# Patient Record
Sex: MALE | Race: WHITE | NOT HISPANIC OR LATINO | Employment: OTHER | URBAN - METROPOLITAN AREA
[De-identification: names, ages, dates, MRNs, and addresses within clinical notes are randomized per-mention and may not be internally consistent; named-entity substitution may affect disease eponyms.]

---

## 2018-01-05 ENCOUNTER — APPOINTMENT (OUTPATIENT)
Dept: RADIOLOGY | Facility: CLINIC | Age: 68
End: 2018-01-05
Payer: COMMERCIAL

## 2018-01-05 ENCOUNTER — GENERIC CONVERSION - ENCOUNTER (OUTPATIENT)
Dept: OTHER | Facility: OTHER | Age: 68
End: 2018-01-05

## 2018-01-05 ENCOUNTER — TRANSCRIBE ORDERS (OUTPATIENT)
Dept: ADMINISTRATIVE | Facility: HOSPITAL | Age: 68
End: 2018-01-05

## 2018-01-05 DIAGNOSIS — M25.569 PAIN IN KNEE: ICD-10-CM

## 2018-01-05 DIAGNOSIS — M23.42 LOOSE BODY OF LEFT KNEE: ICD-10-CM

## 2018-01-05 DIAGNOSIS — M23.42 LOOSE BODY OF LEFT KNEE: Primary | ICD-10-CM

## 2018-01-05 PROCEDURE — 73562 X-RAY EXAM OF KNEE 3: CPT

## 2018-01-12 ENCOUNTER — HOSPITAL ENCOUNTER (OUTPATIENT)
Dept: MRI IMAGING | Facility: HOSPITAL | Age: 68
Discharge: HOME | End: 2018-01-12
Attending: FAMILY MEDICINE
Payer: COMMERCIAL

## 2018-01-12 ENCOUNTER — GENERIC CONVERSION - ENCOUNTER (OUTPATIENT)
Dept: OTHER | Facility: OTHER | Age: 68
End: 2018-01-12

## 2018-01-12 DIAGNOSIS — M23.42 LOOSE BODY OF LEFT KNEE: ICD-10-CM

## 2018-01-12 PROCEDURE — 73721 MRI JNT OF LWR EXTRE W/O DYE: CPT

## 2018-01-15 ENCOUNTER — GENERIC CONVERSION - ENCOUNTER (OUTPATIENT)
Dept: OTHER | Facility: OTHER | Age: 68
End: 2018-01-15

## 2018-01-24 VITALS — SYSTOLIC BLOOD PRESSURE: 176 MMHG | WEIGHT: 234 LBS | DIASTOLIC BLOOD PRESSURE: 97 MMHG | HEART RATE: 86 BPM

## 2018-01-24 VITALS — HEART RATE: 81 BPM | DIASTOLIC BLOOD PRESSURE: 80 MMHG | SYSTOLIC BLOOD PRESSURE: 150 MMHG

## 2018-02-05 ENCOUNTER — OFFICE VISIT (OUTPATIENT)
Dept: OBGYN CLINIC | Facility: MEDICAL CENTER | Age: 68
End: 2018-02-05
Payer: COMMERCIAL

## 2018-02-05 VITALS
DIASTOLIC BLOOD PRESSURE: 76 MMHG | HEART RATE: 82 BPM | SYSTOLIC BLOOD PRESSURE: 146 MMHG | HEIGHT: 71 IN | WEIGHT: 233 LBS | BODY MASS INDEX: 32.62 KG/M2

## 2018-02-05 DIAGNOSIS — M17.5 OTHER SECONDARY OSTEOARTHRITIS OF LEFT KNEE: Primary | ICD-10-CM

## 2018-02-05 PROBLEM — M19.90 DJD (DEGENERATIVE JOINT DISEASE): Status: ACTIVE | Noted: 2018-01-05

## 2018-02-05 PROCEDURE — 20610 DRAIN/INJ JOINT/BURSA W/O US: CPT | Performed by: FAMILY MEDICINE

## 2018-02-05 RX ORDER — AMLODIPINE BESYLATE 5 MG/1
5 TABLET ORAL 2 TIMES DAILY
Refills: 6 | COMMUNITY
Start: 2018-01-02 | End: 2018-12-14 | Stop reason: ALTCHOICE

## 2018-02-05 RX ORDER — LIDOCAINE HYDROCHLORIDE 10 MG/ML
3 INJECTION, SOLUTION INFILTRATION; PERINEURAL
Status: COMPLETED | OUTPATIENT
Start: 2018-02-05 | End: 2018-02-05

## 2018-02-05 RX ORDER — INSULIN GLARGINE 100 [IU]/ML
INJECTION, SOLUTION SUBCUTANEOUS
Refills: 6 | COMMUNITY
Start: 2018-01-02 | End: 2019-02-18 | Stop reason: SDUPTHER

## 2018-02-05 RX ADMIN — LIDOCAINE HYDROCHLORIDE 3 ML: 10 INJECTION, SOLUTION INFILTRATION; PERINEURAL at 11:31

## 2018-02-05 NOTE — PROGRESS NOTES
1  Other secondary osteoarthritis of left knee       Orders Placed This Encounter   Procedures    Large joint arthrocentesis        Imaging Studies (I personally reviewed results in PACS):    IMPRESSION:    Severe left knee osteoarthritis status post septic joint arthritis 8 years ago      Return in about 6 weeks (around 3/19/2018)  Will discuss consideration knee replacement   Versus corticosteroid injection at that time  Patient Instructions   Synvisc one  injection given today  Informed patient of risk of infection  Educated patient if any redness, swelling, fevers, worsening pain and he is to notify physician immediately as may be a sign of infection in his knee and need for immediate intervention  CHIEF COMPLAINT:  Follow-up left knee arthritis    HPI:  Hung Mason is a 74 y o  male  who presents for  Follow-up left knee arthritis  Patient's septic knee arthritis approximately 8 years ago with treatment and now has severe tricompartmental arthritis  He is reluctant for knee replacement this time and wishes to pursue conservative nonoperative treatment  He presents today for injection of Synvisc one  He denies any history of blood thinner use  He does have diabetes but states is well controlled  Review of Systems   Constitutional: Negative for chills and fever     Musculoskeletal:        Positive left knee stiffness         Following history reviewed and update:    Past Medical History:   Diagnosis Date    Diabetes ([address] )     type 2    Hypertension      Past Surgical History:   Procedure Laterality Date    HERNIA REPAIR  2013    KNEE SURGERY       Social History   History   Alcohol Use    Yes     Comment: occasional     History   Drug Use No     History   Smoking Status    Never Smoker   Smokeless Tobacco    Never Used     Family History   Problem Relation Age of Onset    Aortic stenosis Mother     No Known Problems Father     Diabetes Paternal Grandfather      No Known Allergies       Physical Exam   Constitutional: He is oriented to person, place, and time  He appears well-developed and well-nourished  HENT:   Head: Normocephalic and atraumatic  Right Ear: External ear normal    Left Ear: External ear normal    Nose: Nose normal    Eyes: Conjunctivae are normal  Right eye exhibits no discharge  Left eye exhibits no discharge  No scleral icterus  Neck: Neck supple  Pulmonary/Chest: Effort normal  No respiratory distress  Musculoskeletal:        Left knee: He exhibits no effusion  Neurological: He is alert and oriented to person, place, and time  Psychiatric: He has a normal mood and affect  His behavior is normal  Judgment and thought content normal        Left Knee Exam     Tenderness   The patient is experiencing no tenderness           Other   Erythema: absent  Swelling: none  Effusion: no effusion present              Large joint arthrocentesis  Date/Time: 2/5/2018 11:31 AM  Consent given by: patient  Site marked: site marked  Timeout: Immediately prior to procedure a time out was called to verify the correct patient, procedure, equipment, support staff and site/side marked as required   Supporting Documentation  Indications: pain   Procedure Details  Location: knee -   Preparation: Patient was prepped and draped in the usual sterile fashion (Betadine and alcohol)  Needle size: 22 G  Ultrasound guidance: no  Approach: anterolateral  Medications administered: 3 mL lidocaine 1 %; 48 mg hylan 48 MG/6ML    Patient tolerance: patient tolerated the procedure well with no immediate complications  Dressing:  Sterile dressing applied

## 2018-02-05 NOTE — PATIENT INSTRUCTIONS
Synvisc one  injection given today  Informed patient of risk of infection  Educated patient if any redness, swelling, fevers, worsening pain and he is to notify physician immediately as may be a sign of infection in his knee and need for immediate intervention

## 2018-03-19 ENCOUNTER — OFFICE VISIT (OUTPATIENT)
Dept: OBGYN CLINIC | Facility: MEDICAL CENTER | Age: 68
End: 2018-03-19
Payer: COMMERCIAL

## 2018-03-19 VITALS
DIASTOLIC BLOOD PRESSURE: 90 MMHG | HEART RATE: 80 BPM | BODY MASS INDEX: 32.06 KG/M2 | HEIGHT: 71 IN | WEIGHT: 229 LBS | SYSTOLIC BLOOD PRESSURE: 155 MMHG

## 2018-03-19 DIAGNOSIS — M17.5 OTHER SECONDARY OSTEOARTHRITIS OF LEFT KNEE: Primary | ICD-10-CM

## 2018-03-19 PROCEDURE — 20610 DRAIN/INJ JOINT/BURSA W/O US: CPT | Performed by: FAMILY MEDICINE

## 2018-03-19 RX ORDER — INSULIN GLULISINE 100 [IU]/ML
INJECTION, SOLUTION SUBCUTANEOUS
COMMUNITY
Start: 2018-02-27 | End: 2018-06-22

## 2018-03-19 RX ORDER — LIDOCAINE HYDROCHLORIDE 10 MG/ML
4 INJECTION, SOLUTION INFILTRATION; PERINEURAL
Status: COMPLETED | OUTPATIENT
Start: 2018-03-19 | End: 2018-03-19

## 2018-03-19 RX ORDER — TRIAMCINOLONE ACETONIDE 40 MG/ML
40 INJECTION, SUSPENSION INTRA-ARTICULAR; INTRAMUSCULAR
Status: COMPLETED | OUTPATIENT
Start: 2018-03-19 | End: 2018-03-19

## 2018-03-19 RX ADMIN — TRIAMCINOLONE ACETONIDE 40 MG: 40 INJECTION, SUSPENSION INTRA-ARTICULAR; INTRAMUSCULAR at 11:05

## 2018-03-19 RX ADMIN — LIDOCAINE HYDROCHLORIDE 4 ML: 10 INJECTION, SOLUTION INFILTRATION; PERINEURAL at 11:05

## 2018-03-19 NOTE — PATIENT INSTRUCTIONS
Continue home exercises  reviewed red flags with patient regarding injection including infection, skin dimpling, hypo-pigmentation, and nerve damage  patient expressed understanding and agreed to proceed with procedure  educated if any symptoms including fevers, chills, swelling, or worsening symptoms occur then to call office or go to hospital for immediate care if physician unavailable  patient expressed understanding and agreed to plan  Will follow up in 3 months to consider repeat corticosteroid injection  Patient will also likely require 2nd Synvisc injection 6 months from his last 02/05/2018

## 2018-03-19 NOTE — PROGRESS NOTES
1  Other secondary osteoarthritis of left knee  Large joint arthrocentesis     Orders Placed This Encounter   Procedures    Large joint arthrocentesis        Imaging Studies (I personally reviewed results in PACS):    Past diagnostics:  X-ray left knee 01/05/2018:  Severe tricompartmental arthritis  MRI left knee 01/12/2018:  Severe tricompartmental arthritis with bilateral meniscal tears    IMPRESSION:  Severe left knee osteoarthritis status post septic joint arthritis 2010      Return in about 3 months (around 6/19/2018)  Will discuss consideration knee replacement   Versus corticosteroid injection at that time  Patient Instructions   Continue home exercises  reviewed red flags with patient regarding injection including infection, skin dimpling, hypo-pigmentation, and nerve damage  patient expressed understanding and agreed to proceed with procedure  educated if any symptoms including fevers, chills, swelling, or worsening symptoms occur then to call office or go to hospital for immediate care if physician unavailable  patient expressed understanding and agreed to plan  Will follow up in 3 months to consider repeat corticosteroid injection  Patient will also likely require 2nd Synvisc injection 6 months from his last 02/05/2018  CHIEF COMPLAINT:  Follow-up left knee arthritis    HPI:  Roberto Farias is a 74 y o  male  who presents for  Follow-up left knee arthritis  Patient's septic knee arthritis approximately 8 years ago with treatment and now has severe tricompartmental arthritis  He is reluctant for knee replacement this time and wishes to pursue conservative nonoperative treatment  Last visit patient was given Synvisc-One injection on 02/05/2018  He states he did have improvement in his left knee; however, he still has persistent mild to moderate achiness and stiffness worst in the morning time  Chronic left knee arthritis uncontrolled          Review of Systems   Constitutional: Negative for chills and fever  Musculoskeletal:        Positive left knee stiffness         Following history reviewed and update:    Past Medical History:   Diagnosis Date    Diabetes ([address] )     type 2    Hypertension      Past Surgical History:   Procedure Laterality Date    HERNIA REPAIR  2013    KNEE SURGERY       Social History   History   Alcohol Use    Yes     Comment: occasional     History   Drug Use No     History   Smoking Status    Never Smoker   Smokeless Tobacco    Never Used     Family History   Problem Relation Age of Onset    Aortic stenosis Mother     No Known Problems Father     Diabetes Paternal Grandfather      No Known Allergies       Physical Exam   Constitutional: He is oriented to person, place, and time  He appears well-developed and well-nourished  HENT:   Head: Normocephalic and atraumatic  Right Ear: External ear normal    Left Ear: External ear normal    Nose: Nose normal    Eyes: Conjunctivae are normal  Right eye exhibits no discharge  Left eye exhibits no discharge  No scleral icterus  Neck: Neck supple  Pulmonary/Chest: Effort normal  No respiratory distress  Musculoskeletal:        Left knee: He exhibits no effusion  Neurological: He is alert and oriented to person, place, and time  Psychiatric: He has a normal mood and affect  His behavior is normal  Judgment and thought content normal        Left Knee Exam     Tenderness   The patient is experiencing no tenderness           Other   Erythema: absent  Swelling: none  Effusion: no effusion present              Large joint arthrocentesis  Date/Time: 3/19/2018 11:05 AM  Consent given by: patient  Site marked: site marked  Timeout: Immediately prior to procedure a time out was called to verify the correct patient, procedure, equipment, support staff and site/side marked as required   Supporting Documentation  Indications: pain   Procedure Details  Location: knee - L knee  Preparation: Patient was prepped and draped in the usual sterile fashion (betadine if not allergic   alcohol before and after ethyl chloride spray)  Needle size: 22 G  Ultrasound guidance: no  Approach: anterolateral  Medications administered: 4 mL lidocaine 1 %; 40 mg triamcinolone acetonide 40 mg/mL    Patient tolerance: patient tolerated the procedure well with no immediate complications  Dressing:  Sterile dressing applied

## 2018-06-22 ENCOUNTER — OFFICE VISIT (OUTPATIENT)
Dept: OBGYN CLINIC | Facility: MEDICAL CENTER | Age: 68
End: 2018-06-22
Payer: COMMERCIAL

## 2018-06-22 ENCOUNTER — APPOINTMENT (OUTPATIENT)
Dept: RADIOLOGY | Facility: CLINIC | Age: 68
End: 2018-06-22
Payer: COMMERCIAL

## 2018-06-22 VITALS
HEIGHT: 71 IN | HEART RATE: 80 BPM | DIASTOLIC BLOOD PRESSURE: 89 MMHG | WEIGHT: 227 LBS | SYSTOLIC BLOOD PRESSURE: 165 MMHG | BODY MASS INDEX: 31.78 KG/M2

## 2018-06-22 DIAGNOSIS — M17.5 OTHER SECONDARY OSTEOARTHRITIS OF LEFT KNEE: ICD-10-CM

## 2018-06-22 DIAGNOSIS — M54.5 LOW BACK PAIN, UNSPECIFIED BACK PAIN LATERALITY, UNSPECIFIED CHRONICITY, WITH SCIATICA PRESENCE UNSPECIFIED: Primary | ICD-10-CM

## 2018-06-22 DIAGNOSIS — M54.5 LOW BACK PAIN, UNSPECIFIED BACK PAIN LATERALITY, UNSPECIFIED CHRONICITY, WITH SCIATICA PRESENCE UNSPECIFIED: ICD-10-CM

## 2018-06-22 PROCEDURE — 20610 DRAIN/INJ JOINT/BURSA W/O US: CPT | Performed by: FAMILY MEDICINE

## 2018-06-22 PROCEDURE — 99214 OFFICE O/P EST MOD 30 MIN: CPT | Performed by: FAMILY MEDICINE

## 2018-06-22 PROCEDURE — 72100 X-RAY EXAM L-S SPINE 2/3 VWS: CPT

## 2018-06-22 RX ORDER — BUPIVACAINE HYDROCHLORIDE 2.5 MG/ML
2 INJECTION, SOLUTION INFILTRATION; PERINEURAL
Status: COMPLETED | OUTPATIENT
Start: 2018-06-22 | End: 2018-06-22

## 2018-06-22 RX ORDER — CEPHALEXIN 500 MG/1
CAPSULE ORAL
Refills: 0 | COMMUNITY
Start: 2018-05-23 | End: 2018-06-22

## 2018-06-22 RX ORDER — ASPIRIN 81 MG/1
TABLET, CHEWABLE ORAL AS NEEDED
COMMUNITY
Start: 2018-01-02 | End: 2019-06-21

## 2018-06-22 RX ORDER — METHYLPREDNISOLONE ACETATE 40 MG/ML
1 INJECTION, SUSPENSION INTRA-ARTICULAR; INTRALESIONAL; INTRAMUSCULAR; SOFT TISSUE
Status: COMPLETED | OUTPATIENT
Start: 2018-06-22 | End: 2018-06-22

## 2018-06-22 RX ORDER — ROSUVASTATIN CALCIUM 5 MG/1
TABLET, COATED ORAL EVERY 24 HOURS
COMMUNITY
Start: 2018-02-12 | End: 2018-06-22

## 2018-06-22 RX ADMIN — METHYLPREDNISOLONE ACETATE 1 ML: 40 INJECTION, SUSPENSION INTRA-ARTICULAR; INTRALESIONAL; INTRAMUSCULAR; SOFT TISSUE at 13:04

## 2018-06-22 RX ADMIN — BUPIVACAINE HYDROCHLORIDE 2 ML: 2.5 INJECTION, SOLUTION INFILTRATION; PERINEURAL at 13:04

## 2018-06-22 NOTE — PATIENT INSTRUCTIONS
Explained the patient that he did have viscosupplementation injection in February 5, 2018  Usually insurance companies pay for this  Once every 6 months  His next anticipated possible date of viscosupplementation as August or September of 2018  Patient will call back at that time if he has any pain or desire to have viscosupplementation and we will set up his appointment  reviewed red flags with patient regarding injection including infection, skin dimpling, hypo-pigmentation, and nerve damage  patient expressed understanding and agreed to proceed with procedure  educated if any symptoms including fevers, chills, swelling, or worsening symptoms occur then to call office or go to hospital for immediate care if physician unavailable  patient expressed understanding and agreed to plan  For patient's back pain explained that he had a back strain and tear of his muscle that is not resolved  He has no evidence of pinched nerve at this time  He has mild-to-moderate osteoarthritis of his low back   Known as degenerative disc disease  Educated risks of mixing NSAIDS (also known as non-steroidal anti-inflammatory pills) including advil, ibuprofen, motrin, aleve, naproxen, aspirin, and ibuprofen containing products with each other or with steroids (such as prednisone, medrol)  Explained risks of mixing these medications including stomach ulcer, severe internal bleeding, and kidney failure  Instructed not to take NSAIDS if have history of stomach ulcers, kidney issues, or hypertension  Instructed patient to use only one brand as prescribed  For naproxen, a maximum of 500 mg per dose every 12 hours and no more than two doses or 1,000mg per day  For Ibuprofen, a maximum of 800 mg per dose every 6 hours but no more than 3 doses or 2,400 mg per day  Never take these medications together  Never take these medications the same day   For severe pain and only if you have no liver problems, you may add Tylenol (also known as acetaminophen) maximum of 1,000  Mg per dose every 6 hours but no more 3 doses or 3,000 mg per day  Patient expressed understanding and agreed to plan

## 2018-06-22 NOTE — PROGRESS NOTES
1  Low back pain, unspecified back pain laterality, unspecified chronicity, with sciatica presence unspecified  XR spine lumbar 2 or 3 views injury   2  Other secondary osteoarthritis of left knee       Orders Placed This Encounter   Procedures    Large joint arthrocentesis    XR spine lumbar 2 or 3 views injury        Imaging Studies (I personally reviewed results in PACS):  X-ray lumbar vertebra 06/22/2018:  Mild-moderate degenerative disc disease diffuse    Past diagnostics:  X-ray left knee 01/05/2018:  Severe tricompartmental arthritis  MRI left knee 01/12/2018:  Severe tricompartmental arthritis with bilateral meniscal tears    IMPRESSION:  Severe left knee osteoarthritis status post septic joint arthritis 2010      Return if symptoms worsen or fail to improve  Patient Instructions   Explained the patient that he did have viscosupplementation injection in February 5, 2018  Usually insurance companies pay for this  Once every 6 months  His next anticipated possible date of viscosupplementation as August or September of 2018  Patient will call back at that time if he has any pain or desire to have viscosupplementation and we will set up his appointment  reviewed red flags with patient regarding injection including infection, skin dimpling, hypo-pigmentation, and nerve damage  patient expressed understanding and agreed to proceed with procedure  educated if any symptoms including fevers, chills, swelling, or worsening symptoms occur then to call office or go to hospital for immediate care if physician unavailable  patient expressed understanding and agreed to plan  For patient's back pain explained that he had a back strain and tear of his muscle that is not resolved  He has no evidence of pinched nerve at this time  He has mild-to-moderate osteoarthritis of his low back   Known as degenerative disc disease      Educated risks of mixing NSAIDS (also known as non-steroidal anti-inflammatory pills) including advil, ibuprofen, motrin, aleve, naproxen, aspirin, and ibuprofen containing products with each other or with steroids (such as prednisone, medrol)  Explained risks of mixing these medications including stomach ulcer, severe internal bleeding, and kidney failure  Instructed not to take NSAIDS if have history of stomach ulcers, kidney issues, or hypertension  Instructed patient to use only one brand as prescribed  For naproxen, a maximum of 500 mg per dose every 12 hours and no more than two doses or 1,000mg per day  For Ibuprofen, a maximum of 800 mg per dose every 6 hours but no more than 3 doses or 2,400 mg per day  Never take these medications together  Never take these medications the same day  For severe pain and only if you have no liver problems, you may add Tylenol (also known as acetaminophen) maximum of 1,000  Mg per dose every 6 hours but no more 3 doses or 3,000 mg per day  Patient expressed understanding and agreed to plan  CHIEF COMPLAINT:  Follow-up left knee arthritis and complains of back pain    HPI:  Lacey Dunn is a 74 y o  male  who presents for  Follow-up left knee arthritis  Patient's septic knee arthritis approximately 8 years ago with treatment and now has severe tricompartmental arthritis  He is reluctant for knee replacement this time and wishes to pursue conservative nonoperative treatment  Last visit patient was given Synvisc-One injection on 02/05/2018  He states he did have improvement in his left knee; however, he still has persistent mild to moderate achiness and stiffness worst in the morning time  Chronic left knee arthritis uncontrolled  Visit 06/22/2018:  Patient here for follow-up left knee secondary osteoarthritis status post septic joint  Today states he feels significantly improved  He states that it is last visit in March 2018 he fell he had great relief after Synvisc as well as corticosteroid injection   He does complain of mild to moderate discomfort in his left knee but this is no where near as worse at was in the past   He does request repeat corticosteroid injection today  Patient also complains of back pain today  He states that approximately 1 month ago he strained his back  He is significantly improved  He still early has some residual stiffness and left lower aspect of his back  He denies any pain radiating down his leg  Denies any numbness and tingling  He denies any weakness  His discomfort is intermittent exacerbated by certain positions only mild intensity  Review of Systems   Constitutional: Negative for chills, fever and unexpected weight change  HENT: Negative for hearing loss, nosebleeds and sore throat  Eyes: Negative for pain, redness and visual disturbance  Respiratory: Negative for cough, shortness of breath and wheezing  Cardiovascular: Negative for chest pain, palpitations and leg swelling  Gastrointestinal: Negative for abdominal distention, nausea and vomiting  Endocrine: Negative for polydipsia and polyuria  Genitourinary: Negative for dysuria and hematuria  Skin: Negative for rash and wound  Neurological: Negative for dizziness, numbness and headaches  Psychiatric/Behavioral: Negative for decreased concentration and suicidal ideas           Following history reviewed and update:    Past Medical History:   Diagnosis Date    Diabetes ([facility] [address] )     type 2    Hypertension      Past Surgical History:   Procedure Laterality Date    HERNIA REPAIR  2013    KNEE SURGERY      NECK SURGERY      basil cell carcinoma      Social History   History   Alcohol Use    Yes     Comment: occasional     History   Drug Use No     History   Smoking Status    Never Smoker   Smokeless Tobacco    Never Used     Family History   Problem Relation Age of Onset    Aortic stenosis Mother     No Known Problems Father     Diabetes Paternal Grandfather      No Known Allergies       Physical Exam   Constitutional: He is oriented to person, place, and time  He appears well-developed and well-nourished  HENT:   Head: Normocephalic and atraumatic  Right Ear: External ear normal    Left Ear: External ear normal    Nose: Nose normal    Eyes: Conjunctivae are normal  Right eye exhibits no discharge  Left eye exhibits no discharge  No scleral icterus  Neck: Neck supple  Pulmonary/Chest: Effort normal  No respiratory distress  Neurological: He is alert and oriented to person, place, and time  Psychiatric: He has a normal mood and affect  His behavior is normal  Judgment and thought content normal            LEFT KNEE:  Erythema: no  Swelling: no  Increased Warmth: no  Tenderness: none  Flexion: intact  Extension: intact  Patellar Displacement:  Patellar Tilt:  Patellar Apprehension: negative  Patellar Grind Parekh's: negative  Lachman's: negative  Drawer: negative  Varus laxity: negative  Valgus laxity: negative  [facility]: negative   Thessaly Test:  Dial Test:     Large joint arthrocentesis  Date/Time: 6/22/2018 1:04 PM  Consent given by: patient  Site marked: site marked  Timeout: Immediately prior to procedure a time out was called to verify the correct patient, procedure, equipment, support staff and site/side marked as required   Supporting Documentation  Indications: pain   Procedure Details  Location: knee - L knee  Preparation: Patient was prepped and draped in the usual sterile fashion (betadine if not allergic   alcohol before and after ethyl chloride spray)  Needle size: 22 G  Ultrasound guidance: no  Approach: anterolateral  Medications administered: 2 mL bupivacaine 0 25 %; 1 mL methylPREDNISolone acetate 40 mg/mL    Patient tolerance: patient tolerated the procedure well with no immediate complications  Dressing:  Sterile dressing applied

## 2018-08-20 ENCOUNTER — TELEPHONE (OUTPATIENT)
Dept: PAIN MEDICINE | Facility: MEDICAL CENTER | Age: 68
End: 2018-08-20

## 2018-08-20 NOTE — TELEPHONE ENCOUNTER
Patient called to schedule an appt for a synvisc injection  Patient stated someone was supposed to be working on this as of 08/05/18 but no one has yet to call   C/b [phone]

## 2018-08-21 NOTE — TELEPHONE ENCOUNTER
Patient called back again today for an update  Transferred to [facility]

## 2018-08-27 NOTE — TELEPHONE ENCOUNTER
Patient has an appt scheduled as of today, and that appt is for this Thursday, 8/30   He is B&B per Shannon

## 2018-08-27 NOTE — TELEPHONE ENCOUNTER
Patient called again to follow up on status of synvisc injections  He is frustrated he is not getting any response

## 2018-09-07 ENCOUNTER — OFFICE VISIT (OUTPATIENT)
Dept: OBGYN CLINIC | Facility: MEDICAL CENTER | Age: 68
End: 2018-09-07
Payer: COMMERCIAL

## 2018-09-07 VITALS
HEART RATE: 81 BPM | BODY MASS INDEX: 31.78 KG/M2 | DIASTOLIC BLOOD PRESSURE: 80 MMHG | WEIGHT: 227 LBS | HEIGHT: 71 IN | SYSTOLIC BLOOD PRESSURE: 149 MMHG

## 2018-09-07 DIAGNOSIS — M17.5 OTHER SECONDARY OSTEOARTHRITIS OF LEFT KNEE: Primary | ICD-10-CM

## 2018-09-07 PROCEDURE — 20610 DRAIN/INJ JOINT/BURSA W/O US: CPT | Performed by: FAMILY MEDICINE

## 2018-09-07 PROCEDURE — 99213 OFFICE O/P EST LOW 20 MIN: CPT | Performed by: FAMILY MEDICINE

## 2018-09-07 RX ADMIN — METHYLPREDNISOLONE ACETATE 1 ML: 40 INJECTION, SUSPENSION INTRA-ARTICULAR; INTRALESIONAL; INTRAMUSCULAR; SOFT TISSUE at 16:42

## 2018-09-07 RX ADMIN — LIDOCAINE HYDROCHLORIDE 4 ML: 10 INJECTION, SOLUTION INFILTRATION; PERINEURAL at 16:42

## 2018-09-07 NOTE — PATIENT INSTRUCTIONS
Treatment for knee osteoarthritis depends on severity of cartilage wear and pain levels  First line for mild arthritis is exercise to strengthen the knee and allow better joint movement, 5-10% weight loss if overweight, and topical anti-inflammatories such as diclofenac gel or topical analgesic pain relief creams such as capsaicin  Symptoms at this stage usually improve in 3 months  Moderate to severe arthritis or arthritis not responding to first line treatments may require oral medicine such as anti-inflammatories (NSAIDs) such as ibuprofen/motrin, and if failing treatment with oral NSAIDs, then may consider depression medicines such as duloxetine (cymbalta) which also have been shown to work on pain receptors and help to control pain  Other analgesics such as tylenol (acetaminophen) may be used but do not appear to offer significant pain relief  Supplements such as glucosamine and chondroitin supplements  Some studies do show benefit from taking glucosamine sulfate (1500 mg/day) and chondroitin (800 mg/day) but evidence is controversial  I recommend against a type of glucosamine known as "glucosamine hydrochloride" which appears not as effective as glucosamine sulfate  If no improvement with oral medicines, then patients may consider steroid injection into the knee joint which provides pain relief  Steroid injections can be given every 3 months  Any sooner than that can  result in possible deterioration or cartilage in your joint  Other injections are available such as as viscosupplementation or gel shots into the knee which provide nutrients for the cartilage and can result in pain reduction  These viscosupplementation injections are generally considered every 6 months but are controversial   The [address] recommends against viscosupplementation injection; however, the [facility] recommends for these injections       Beyond these injections there are other controversial treatments including stem cell as well as platelet rich plasma injection which are out of pocket usually up to a 1000 dollars per injection  Lastly, if you fail conservative measures and have persistent pain, then we may consider referral to surgeon for knee replacement  (ROSIE Chao 2018)

## 2018-09-07 NOTE — PROGRESS NOTES
1  Other secondary osteoarthritis of left knee       Orders Placed This Encounter   Procedures    Large joint arthrocentesis        Imaging Studies (I personally reviewed results in PACS):      Past diagnostics:  X-ray lumbar vertebra 06/22/2018:  Mild-moderate degenerative disc disease diffuse  X-ray left knee 01/05/2018:  Severe tricompartmental arthritis  MRI left knee 01/12/2018:  Severe tricompartmental arthritis with bilateral meniscal tears    IMPRESSION:  Severe left knee osteoarthritis status post septic joint arthritis 2010    Interventions:  Viscosupplementation Synvisc-One injection left knee-09/07/2018   Corticosteroid injection-left knee-06/22/2018    Return if symptoms worsen or fail to improve  Patient Instructions   Treatment for knee osteoarthritis depends on severity of cartilage wear and pain levels  First line for mild arthritis is exercise to strengthen the knee and allow better joint movement, 5-10% weight loss if overweight, and topical anti-inflammatories such as diclofenac gel or topical analgesic pain relief creams such as capsaicin  Symptoms at this stage usually improve in 3 months  Moderate to severe arthritis or arthritis not responding to first line treatments may require oral medicine such as anti-inflammatories (NSAIDs) such as ibuprofen/motrin, and if failing treatment with oral NSAIDs, then may consider depression medicines such as duloxetine (cymbalta) which also have been shown to work on pain receptors and help to control pain  Other analgesics such as tylenol (acetaminophen) may be used but do not appear to offer significant pain relief  Supplements such as glucosamine and chondroitin supplements   Some studies do show benefit from taking glucosamine sulfate (1500 mg/day) and chondroitin (800 mg/day) but evidence is controversial  I recommend against a type of glucosamine known as "glucosamine hydrochloride" which appears not as effective as glucosamine sulfate  If no improvement with oral medicines, then patients may consider steroid injection into the knee joint which provides pain relief  Steroid injections can be given every 3 months  Any sooner than that can  result in possible deterioration or cartilage in your joint  Other injections are available such as as viscosupplementation or gel shots into the knee which provide nutrients for the cartilage and can result in pain reduction  These viscosupplementation injections are generally considered every 6 months but are controversial   The [address] recommends against viscosupplementation injection; however, the [employer] [facility] recommends for these injections  Beyond these injections there are other controversial treatments including stem cell as well as platelet rich plasma injection which are out of pocket usually up to a 1000 dollars per injection  Lastly, if you fail conservative measures and have persistent pain, then we may consider referral to surgeon for knee replacement  (ROSIE Lopez 2018)  CHIEF COMPLAINT:  Follow-up left knee arthritis     HPI:  Audie Walker is a 74 y o  male  who presents for        Visit 06/22/2018:  Patient here for follow-up left knee secondary osteoarthritis status post septic joint  Today states he feels significantly improved  He states that it is last visit in March 2018 he fell he had great relief after Synvisc as well as corticosteroid injection  He does complain of mild to moderate discomfort in his left knee but this is no where near as worse at was in the past   He does request repeat corticosteroid injection today  Patient also complains of back pain today  He states that approximately 1 month ago he strained his back  He is significantly improved  He still early has some residual stiffness and left lower aspect of his back  He denies any pain radiating down his leg    Denies any numbness and tingling  He denies any weakness  His discomfort is intermittent exacerbated by certain positions only mild intensity  09/07/2018: Follow-up left knee osteoarthritis  Uncontrolled  Interval worsening since last visit  No swelling erythema or increased warmth  Source intermittent medial joint line  Last visit 06/22/2018 given corticosteroid injection with significant improvement until approximately 2-3 weeks ago  Review of Systems   Constitutional: Negative for chills and fever  Neurological: Negative for weakness  Following history reviewed and update:    Past Medical History:   Diagnosis Date    Diabetes ([address] )     type 2    Hypertension      Past Surgical History:   Procedure Laterality Date    HERNIA REPAIR  2013    KNEE SURGERY      NECK SURGERY      basil cell carcinoma      Social History   History   Alcohol Use    Yes     Comment: occasional     History   Drug Use No     History   Smoking Status    Never Smoker   Smokeless Tobacco    Never Used     Family History   Problem Relation Age of Onset    Aortic stenosis Mother     No Known Problems Father     Diabetes Paternal Grandfather      No Known Allergies       Physical Exam   Musculoskeletal:        Left knee: He exhibits no effusion  Left Knee Exam     Tenderness   The patient is experiencing no tenderness           Range of Motion   Extension: -10   Flexion: normal     Tests   Lachman:  Anterior - negative    Posterior - negative  Varus: negative  Valgus: negative    Other   Erythema: absent  Sensation: normal  Swelling: none  Effusion: no effusion present            Large joint arthrocentesis  Date/Time: 9/7/2018 4:42 PM  Consent given by: patient  Site marked: site marked  Timeout: Immediately prior to procedure a time out was called to verify the correct patient, procedure, equipment, support staff and site/side marked as required   Supporting Documentation  Indications: pain and diagnostic evaluation Procedure Details  Location: knee - L knee  Preparation: Patient was prepped and draped in the usual sterile fashion (betadine if not allergic   alcohol before and after ethyl chloride cold spray)  Needle size: 22 G  Ultrasound guidance: no  Approach: superior  Medications administered: 4 mL lidocaine 1 %; 1 mL methylPREDNISolone acetate 40 mg/mL    Patient tolerance: patient tolerated the procedure well with no immediate complications  Dressing:  Sterile dressing applied

## 2018-09-10 RX ORDER — METHYLPREDNISOLONE ACETATE 40 MG/ML
1 INJECTION, SUSPENSION INTRA-ARTICULAR; INTRALESIONAL; INTRAMUSCULAR; SOFT TISSUE
Status: COMPLETED | OUTPATIENT
Start: 2018-09-07 | End: 2018-09-07

## 2018-09-10 RX ORDER — LIDOCAINE HYDROCHLORIDE 10 MG/ML
4 INJECTION, SOLUTION INFILTRATION; PERINEURAL
Status: COMPLETED | OUTPATIENT
Start: 2018-09-07 | End: 2018-09-07

## 2018-09-25 ENCOUNTER — OFFICE VISIT (OUTPATIENT)
Dept: FAMILY MEDICINE CLINIC | Facility: CLINIC | Age: 68
End: 2018-09-25
Payer: COMMERCIAL

## 2018-09-25 VITALS
SYSTOLIC BLOOD PRESSURE: 170 MMHG | HEART RATE: 71 BPM | OXYGEN SATURATION: 98 % | WEIGHT: 229 LBS | BODY MASS INDEX: 32.06 KG/M2 | RESPIRATION RATE: 16 BRPM | HEIGHT: 71 IN | DIASTOLIC BLOOD PRESSURE: 100 MMHG

## 2018-09-25 DIAGNOSIS — I10 ESSENTIAL HYPERTENSION: ICD-10-CM

## 2018-09-25 DIAGNOSIS — E11.9 TYPE 2 DIABETES MELLITUS WITHOUT COMPLICATION, WITHOUT LONG-TERM CURRENT USE OF INSULIN (HCC): Primary | ICD-10-CM

## 2018-09-25 DIAGNOSIS — G62.9 NEUROPATHY: ICD-10-CM

## 2018-09-25 PROCEDURE — 99213 OFFICE O/P EST LOW 20 MIN: CPT | Performed by: SPECIALIST

## 2018-09-25 PROCEDURE — 3008F BODY MASS INDEX DOCD: CPT | Performed by: SPECIALIST

## 2018-09-25 NOTE — PATIENT INSTRUCTIONS
OBTAIN  LAB  TESTS    DO  HOME  MONITOR  BP  AND  SUGARS       NEEDS  EYE  AND  DENTAL  ROUTINE   EXAMS  IF  NOT  CURRENT

## 2018-09-25 NOTE — PROGRESS NOTES
Assessment/Plan:PT NEEDS  TO  TAKE  HIS  BP  MEDS  AND  INCREASE  THE  LANTUS   DM2  NOT  CONTROLLED           Diagnoses and all orders for this visit:    Type 2 diabetes mellitus without complication, without long-term current use of insulin (HCC)  -     CBC and differential; Future  -     Comprehensive metabolic panel; Future  -     Lipid panel; Future  -     Magnesium; Future  -     Microalbumin / creatinine urine ratio  -     UA w Reflex to Microscopic w Reflex to Culture -Lab Collect  -     Vitamin B12; Future  -     Vitamin D 25 hydroxy; Future    Essential hypertension  -     CBC and differential; Future  -     Comprehensive metabolic panel; Future  -     Lipid panel; Future  -     Magnesium; Future  -     Microalbumin / creatinine urine ratio  -     UA w Reflex to Microscopic w Reflex to Culture -Lab Collect  -     Vitamin B12; Future  -     Vitamin D 25 hydroxy; Future    Neuropathy  -     CBC and differential; Future  -     Comprehensive metabolic panel; Future  -     Lipid panel; Future  -     Magnesium; Future  -     Microalbumin / creatinine urine ratio  -     UA w Reflex to Microscopic w Reflex to Culture -Lab Collect  -     Vitamin B12; Future  -     Vitamin D 25 hydroxy; Future          Subjective:      Patient ID: Lilian Villegas is a 74 y o  female  73 YO  MALE  WITH  HT  AND  DM2     NOT  TAKING  HIS   AMLODIPINE   AND   HAS  NOT  AD  LAB  TESTS    NEEDS  LAB      WANTS  TO  TRY  QuarterlySTYLE  ESSIE   SUGAR  MONITOR       DOES  NOT  DO  ACCUCHECKS    ON LANTUS  NOT   METFORMIN            LEGS  HURT ON  METFORMIN        The following portions of the patient's history were reviewed and updated as appropriate: allergies, current medications, past family history, past medical history, past social history, past surgical history and problem list     Review of Systems   Constitutional: Negative for activity change, appetite change, chills, diaphoresis, fatigue and fever     HENT: Negative for sinus pain, sinus pressure and voice change  Eyes: Negative for visual disturbance  Respiratory: Negative for cough, chest tightness, shortness of breath and wheezing  Cardiovascular: Negative for chest pain, palpitations and leg swelling  Gastrointestinal: Negative for abdominal distention and abdominal pain  Genitourinary: Negative for difficulty urinating, dysuria and flank pain  Musculoskeletal: Negative for arthralgias, back pain, gait problem, joint swelling and neck pain  Neurological: Negative for dizziness, tremors, seizures, syncope, facial asymmetry, speech difficulty, weakness, light-headedness, numbness and headaches  Psychiatric/Behavioral: Negative for agitation and behavioral problems  Objective:      /100 (BP Location: Left arm, Patient Position: Sitting, Cuff Size: Adult)   Pulse 71   Resp 16   Ht 5' 11" (1 803 m)   Wt 104 kg (229 lb)   SpO2 98%   BMI 31 94 kg/m²          Physical Exam   Constitutional: She is oriented to person, place, and time  She appears well-developed and well-nourished  No distress  HENT:   Head: Normocephalic  Right Ear: External ear normal    Left Ear: External ear normal    Eyes: Conjunctivae and EOM are normal  Pupils are equal, round, and reactive to light  Right eye exhibits no discharge  Left eye exhibits no discharge  No scleral icterus  Neck: No JVD present  No tracheal deviation present  No thyromegaly present  Cardiovascular: Normal rate, regular rhythm, normal heart sounds and intact distal pulses  Exam reveals no gallop and no friction rub  No murmur heard  Pulmonary/Chest: Effort normal and breath sounds normal  No respiratory distress  She has no wheezes  She has no rales  She exhibits no tenderness  Abdominal: She exhibits no distension  There is no rebound  Musculoskeletal: She exhibits no edema, tenderness or deformity  Neurological: She is alert and oriented to person, place, and time  No cranial nerve deficit  Coordination normal    Skin: Skin is warm and dry  No rash noted  She is not diaphoretic  No erythema  Psychiatric: She has a normal mood and affect   Her behavior is normal

## 2018-10-08 ENCOUNTER — OFFICE VISIT (OUTPATIENT)
Dept: FAMILY MEDICINE CLINIC | Facility: CLINIC | Age: 68
End: 2018-10-08
Payer: COMMERCIAL

## 2018-10-08 DIAGNOSIS — G62.9 NEUROPATHY: ICD-10-CM

## 2018-10-08 DIAGNOSIS — Z79.4 TYPE 2 DIABETES MELLITUS WITHOUT COMPLICATION, WITH LONG-TERM CURRENT USE OF INSULIN (HCC): Primary | ICD-10-CM

## 2018-10-08 DIAGNOSIS — I10 BENIGN HYPERTENSION: ICD-10-CM

## 2018-10-08 DIAGNOSIS — E11.9 TYPE 2 DIABETES MELLITUS WITHOUT COMPLICATION, WITH LONG-TERM CURRENT USE OF INSULIN (HCC): Primary | ICD-10-CM

## 2018-10-08 LAB
25(OH)D3 SERPL-MCNC: 37.7 NG/ML (ref 30–100)
ALBUMIN SERPL BCP-MCNC: 3.7 G/DL (ref 3.5–5)
ALP SERPL-CCNC: 76 U/L (ref 46–116)
ALT SERPL W P-5'-P-CCNC: 28 U/L (ref 12–78)
ANION GAP SERPL CALCULATED.3IONS-SCNC: 11 MMOL/L (ref 4–13)
AST SERPL W P-5'-P-CCNC: 24 U/L (ref 5–45)
BACTERIA UR QL AUTO: NORMAL /HPF
BASOPHILS # BLD AUTO: 0.02 THOUSANDS/ΜL (ref 0–0.1)
BASOPHILS NFR BLD AUTO: 0 % (ref 0–1)
BILIRUB SERPL-MCNC: 0.37 MG/DL (ref 0.2–1)
BILIRUB UR QL STRIP: NEGATIVE
BUN SERPL-MCNC: 32 MG/DL (ref 5–25)
CALCIUM SERPL-MCNC: 8.7 MG/DL (ref 8.3–10.1)
CHLORIDE SERPL-SCNC: 107 MMOL/L (ref 100–108)
CLARITY UR: CLEAR
CO2 SERPL-SCNC: 23 MMOL/L (ref 21–32)
COLOR UR: YELLOW
CREAT SERPL-MCNC: 1.94 MG/DL (ref 0.6–1.3)
CREAT UR-MCNC: 125 MG/DL
EOSINOPHIL # BLD AUTO: 0.08 THOUSAND/ΜL (ref 0–0.61)
EOSINOPHIL NFR BLD AUTO: 1 % (ref 0–6)
ERYTHROCYTE [DISTWIDTH] IN BLOOD BY AUTOMATED COUNT: 13 % (ref 11.6–15.1)
GFR SERPL CREATININE-BSD FRML MDRD: 27 ML/MIN/1.73SQ M
GLUCOSE P FAST SERPL-MCNC: 47 MG/DL (ref 65–99)
GLUCOSE UR STRIP-MCNC: NEGATIVE MG/DL
HCT VFR BLD AUTO: 38.2 % (ref 34.8–46.1)
HGB BLD-MCNC: 12.5 G/DL (ref 11.5–15.4)
HGB UR QL STRIP.AUTO: NEGATIVE
HYALINE CASTS #/AREA URNS LPF: NORMAL /LPF
IMM GRANULOCYTES # BLD AUTO: 0.03 THOUSAND/UL (ref 0–0.2)
IMM GRANULOCYTES NFR BLD AUTO: 0 % (ref 0–2)
KETONES UR STRIP-MCNC: NEGATIVE MG/DL
LEUKOCYTE ESTERASE UR QL STRIP: NEGATIVE
LYMPHOCYTES # BLD AUTO: 1.47 THOUSANDS/ΜL (ref 0.6–4.47)
LYMPHOCYTES NFR BLD AUTO: 18 % (ref 14–44)
MAGNESIUM SERPL-MCNC: 2.5 MG/DL (ref 1.6–2.6)
MCH RBC QN AUTO: 30 PG (ref 26.8–34.3)
MCHC RBC AUTO-ENTMCNC: 32.7 G/DL (ref 31.4–37.4)
MCV RBC AUTO: 92 FL (ref 82–98)
MICROALBUMIN UR-MCNC: 241 MG/L (ref 0–20)
MICROALBUMIN/CREAT 24H UR: 193 MG/G CREATININE (ref 0–30)
MONOCYTES # BLD AUTO: 0.48 THOUSAND/ΜL (ref 0.17–1.22)
MONOCYTES NFR BLD AUTO: 6 % (ref 4–12)
NEUTROPHILS # BLD AUTO: 6.3 THOUSANDS/ΜL (ref 1.85–7.62)
NEUTS SEG NFR BLD AUTO: 75 % (ref 43–75)
NITRITE UR QL STRIP: NEGATIVE
NON-SQ EPI CELLS URNS QL MICRO: NORMAL /HPF
NRBC BLD AUTO-RTO: 0 /100 WBCS
PH UR STRIP.AUTO: 5.5 [PH] (ref 4.5–8)
PLATELET # BLD AUTO: 231 THOUSANDS/UL (ref 149–390)
PMV BLD AUTO: 11.7 FL (ref 8.9–12.7)
POTASSIUM SERPL-SCNC: 4.3 MMOL/L (ref 3.5–5.3)
PROT SERPL-MCNC: 7.4 G/DL (ref 6.4–8.2)
PROT UR STRIP-MCNC: ABNORMAL MG/DL
RBC # BLD AUTO: 4.17 MILLION/UL (ref 3.81–5.12)
RBC #/AREA URNS AUTO: NORMAL /HPF
SODIUM SERPL-SCNC: 141 MMOL/L (ref 136–145)
SP GR UR STRIP.AUTO: 1.01 (ref 1–1.03)
UROBILINOGEN UR QL STRIP.AUTO: 0.2 E.U./DL
VIT B12 SERPL-MCNC: 1624 PG/ML (ref 100–900)
WBC # BLD AUTO: 8.38 THOUSAND/UL (ref 4.31–10.16)
WBC #/AREA URNS AUTO: NORMAL /HPF

## 2018-10-08 PROCEDURE — 82306 VITAMIN D 25 HYDROXY: CPT | Performed by: SPECIALIST

## 2018-10-08 PROCEDURE — 82607 VITAMIN B-12: CPT | Performed by: SPECIALIST

## 2018-10-08 PROCEDURE — 3060F POS MICROALBUMINURIA REV: CPT | Performed by: SPECIALIST

## 2018-10-08 PROCEDURE — 85025 COMPLETE CBC W/AUTO DIFF WBC: CPT | Performed by: SPECIALIST

## 2018-10-08 PROCEDURE — 80053 COMPREHEN METABOLIC PANEL: CPT | Performed by: SPECIALIST

## 2018-10-08 PROCEDURE — 82043 UR ALBUMIN QUANTITATIVE: CPT | Performed by: SPECIALIST

## 2018-10-08 PROCEDURE — 83735 ASSAY OF MAGNESIUM: CPT | Performed by: SPECIALIST

## 2018-10-08 PROCEDURE — 36415 COLL VENOUS BLD VENIPUNCTURE: CPT

## 2018-10-08 PROCEDURE — 82570 ASSAY OF URINE CREATININE: CPT | Performed by: SPECIALIST

## 2018-10-08 PROCEDURE — 81001 URINALYSIS AUTO W/SCOPE: CPT

## 2018-11-28 ENCOUNTER — OFFICE VISIT (OUTPATIENT)
Dept: FAMILY MEDICINE CLINIC | Facility: CLINIC | Age: 68
End: 2018-11-28
Payer: COMMERCIAL

## 2018-11-28 VITALS
WEIGHT: 233 LBS | HEIGHT: 71 IN | BODY MASS INDEX: 32.62 KG/M2 | TEMPERATURE: 97.8 F | SYSTOLIC BLOOD PRESSURE: 132 MMHG | OXYGEN SATURATION: 97 % | DIASTOLIC BLOOD PRESSURE: 80 MMHG | HEART RATE: 79 BPM

## 2018-11-28 DIAGNOSIS — S99.922A INJURY OF TOE ON LEFT FOOT, INITIAL ENCOUNTER: ICD-10-CM

## 2018-11-28 DIAGNOSIS — Z23 NEED FOR PNEUMOCOCCAL VACCINATION: ICD-10-CM

## 2018-11-28 DIAGNOSIS — E11.8 TYPE 2 DIABETES MELLITUS WITH COMPLICATION, WITH LONG-TERM CURRENT USE OF INSULIN (HCC): ICD-10-CM

## 2018-11-28 DIAGNOSIS — N18.4 CKD (CHRONIC KIDNEY DISEASE) STAGE 4, GFR 15-29 ML/MIN (HCC): ICD-10-CM

## 2018-11-28 DIAGNOSIS — R80.9 PROTEINURIA, UNSPECIFIED TYPE: ICD-10-CM

## 2018-11-28 DIAGNOSIS — G62.9 NEUROPATHY: ICD-10-CM

## 2018-11-28 DIAGNOSIS — Z12.11 SCREENING FOR COLON CANCER: Primary | ICD-10-CM

## 2018-11-28 DIAGNOSIS — I10 ESSENTIAL HYPERTENSION: ICD-10-CM

## 2018-11-28 DIAGNOSIS — Z79.4 TYPE 2 DIABETES MELLITUS WITH COMPLICATION, WITH LONG-TERM CURRENT USE OF INSULIN (HCC): ICD-10-CM

## 2018-11-28 PROCEDURE — 99214 OFFICE O/P EST MOD 30 MIN: CPT | Performed by: SPECIALIST

## 2018-11-28 PROCEDURE — 3075F SYST BP GE 130 - 139MM HG: CPT | Performed by: SPECIALIST

## 2018-11-28 PROCEDURE — 3066F NEPHROPATHY DOC TX: CPT | Performed by: SPECIALIST

## 2018-11-28 PROCEDURE — 4040F PNEUMOC VAC/ADMIN/RCVD: CPT

## 2018-11-28 PROCEDURE — 1160F RVW MEDS BY RX/DR IN RCRD: CPT

## 2018-11-28 PROCEDURE — 1160F RVW MEDS BY RX/DR IN RCRD: CPT | Performed by: SPECIALIST

## 2018-11-28 PROCEDURE — 90732 PPSV23 VACC 2 YRS+ SUBQ/IM: CPT

## 2018-11-28 PROCEDURE — 3008F BODY MASS INDEX DOCD: CPT | Performed by: SPECIALIST

## 2018-11-28 PROCEDURE — 3079F DIAST BP 80-89 MM HG: CPT | Performed by: SPECIALIST

## 2018-11-28 PROCEDURE — G0009 ADMIN PNEUMOCOCCAL VACCINE: HCPCS

## 2018-11-28 RX ORDER — INFLUENZA A VIRUSA/MICHIGAN/45/2015 X-275 (H1N1) ANTIGEN (FORMALDEHYDE INACTIVATED), INFLUENZA A VIRUS A/HONG KONG/4801/2014 X-263B (H3N2) ANTIGEN (FORMALDEHYDE INACTIVATED), AND INFLUENZA B VIRUS B/BRISBANE/60/2008 ANTIGEN (FORMALDEHYDE INACTIVATED) 60; 60; 60 UG/.5ML; UG/.5ML; UG/.5ML
INJECTION, SUSPENSION INTRAMUSCULAR
Refills: 0 | COMMUNITY
Start: 2018-10-23

## 2018-11-28 NOTE — PATIENT INSTRUCTIONS
See  Your  Podiatrist    See  Your  Nephrologist    Do lab  Test  Before  I  See  Your   1/2019    See  Me  7  To  10  Days   For  Follow  Up on  Toe    No  nsaid  Drugs  Like alleve  Motrin  Etc    Increase  The  lantus  Until  Am  Sugars   Between   90  -150  Without  Hypoglycemia  By  Increasing  The  Dose   By  4  Units  Every   14  Days       Goal  Of  Therapy  a1c   <  7  %       And  2  hour  after  Eating  Sugar   <  160    Take  5  unoits  Short  Acting insulin  before  Lunch  And  Supper     breakfast  Too  If  You  Can    If  Pasta   Take   10  Units  Short  Acting  insulinm

## 2018-11-28 NOTE — PROGRESS NOTES
Assessment/Plan:    Pt  Seen  For  Newly  Discovered  Injury  Tip  Left  First  Toe    Has  dm2  Out  Of  Control    Needs  To  See  His  Podiatrist  And  Nephrologist       gfr  27       Proteinuria    Hx  dm2  Since  Age  50    Not  controlled           Diagnoses and all orders for this visit:    Screening for colon cancer  -     Ambulatory referral to Gastroenterology; Future  -     CBC and differential; Future  -     Comprehensive metabolic panel; Future  -     Gamma GT; Future  -     HEMOGLOBIN A1C W/ EAG ESTIMATION; Future  -     Lipid panel; Future  -     Magnesium; Future  -     NT-BNP PRO; Future  -     UA w Reflex to Microscopic w Reflex to Culture -Lab Collect  -     Protein electrophoresis, urine  -     Protein electrophoresis, serum; Future  -     Microalbumin / creatinine urine ratio  -     Protein / creatinine ratio, urine    Type 2 diabetes mellitus with complication, with long-term current use of insulin (HCC)  -     CBC and differential; Future  -     Comprehensive metabolic panel; Future  -     Gamma GT; Future  -     HEMOGLOBIN A1C W/ EAG ESTIMATION; Future  -     Lipid panel; Future  -     Magnesium; Future  -     NT-BNP PRO; Future  -     UA w Reflex to Microscopic w Reflex to Culture -Lab Collect  -     Protein electrophoresis, urine  -     Protein electrophoresis, serum; Future  -     Microalbumin / creatinine urine ratio  -     Protein / creatinine ratio, urine    Essential hypertension  -     CBC and differential; Future  -     Comprehensive metabolic panel; Future  -     Gamma GT; Future  -     HEMOGLOBIN A1C W/ EAG ESTIMATION; Future  -     Lipid panel; Future  -     Magnesium; Future  -     NT-BNP PRO; Future  -     UA w Reflex to Microscopic w Reflex to Culture -Lab Collect  -     Protein electrophoresis, urine  -     Protein electrophoresis, serum;  Future  -     Microalbumin / creatinine urine ratio  -     Protein / creatinine ratio, urine    CKD (chronic kidney disease) stage 4, GFR 15-29 ml/min (HCC)  -     CBC and differential; Future  -     Comprehensive metabolic panel; Future  -     Gamma GT; Future  -     HEMOGLOBIN A1C W/ EAG ESTIMATION; Future  -     Lipid panel; Future  -     Magnesium; Future  -     NT-BNP PRO; Future  -     UA w Reflex to Microscopic w Reflex to Culture -Lab Collect  -     Protein electrophoresis, urine  -     Protein electrophoresis, serum; Future  -     Microalbumin / creatinine urine ratio  -     Protein / creatinine ratio, urine    Proteinuria, unspecified type  -     CBC and differential; Future  -     Comprehensive metabolic panel; Future  -     Gamma GT; Future  -     HEMOGLOBIN A1C W/ EAG ESTIMATION; Future  -     Lipid panel; Future  -     Magnesium; Future  -     NT-BNP PRO; Future  -     UA w Reflex to Microscopic w Reflex to Culture -Lab Collect  -     Protein electrophoresis, urine  -     Protein electrophoresis, serum; Future  -     Microalbumin / creatinine urine ratio  -     Protein / creatinine ratio, urine    Neuropathy  -     CBC and differential; Future  -     Comprehensive metabolic panel; Future  -     Gamma GT; Future  -     HEMOGLOBIN A1C W/ EAG ESTIMATION; Future  -     Lipid panel; Future  -     Magnesium; Future  -     NT-BNP PRO; Future  -     UA w Reflex to Microscopic w Reflex to Culture -Lab Collect  -     Protein electrophoresis, urine  -     Protein electrophoresis, serum; Future  -     Microalbumin / creatinine urine ratio  -     Protein / creatinine ratio, urine    Injury of toe on left foot, initial encounter  -     CBC and differential; Future  -     Comprehensive metabolic panel; Future  -     Gamma GT; Future  -     HEMOGLOBIN A1C W/ EAG ESTIMATION; Future  -     Lipid panel; Future  -     Magnesium; Future  -     NT-BNP PRO; Future  -     UA w Reflex to Microscopic w Reflex to Culture -Lab Collect  -     Protein electrophoresis, urine  -     Protein electrophoresis, serum;  Future  -     Microalbumin / creatinine urine ratio  - Protein / creatinine ratio, urine    Other orders  -     FLUZONE HIGH-DOSE 0 5 ML JAZMÍN; TO BE ADMINISTERED BY PHARMACIST FOR IMMUNIZATION          Subjective:      Patient ID: Lorraine Pinon is a 74 y o  female  73 yo  Male  Newly  disccovered  Blister   Tip  Left  Toe    To  See  podiatry        The following portions of the patient's history were reviewed and updated as appropriate: allergies, current medications, past family history, past medical history, past social history, past surgical history and problem list     Review of Systems   Constitutional: Negative for activity change, appetite change, chills, diaphoresis, fatigue and fever  HENT: Negative for dental problem, sinus pressure and voice change  Eyes: Negative for visual disturbance  Respiratory: Negative for cough, chest tightness, shortness of breath and wheezing  Cardiovascular: Negative for chest pain, palpitations and leg swelling  Genitourinary: Negative for difficulty urinating  Musculoskeletal: Negative for arthralgias, back pain, gait problem, joint swelling, myalgias and neck pain  Skin: Positive for wound  Negative for color change, pallor and rash  Blister  Tip  Left  Toe     Neurological: Negative for dizziness, tremors, seizures, syncope, facial asymmetry, speech difficulty, weakness, light-headedness, numbness and headaches  Psychiatric/Behavioral: Negative for agitation and behavioral problems  Objective:      /80 (BP Location: Right arm, Patient Position: Sitting, Cuff Size: Large)   Pulse 79   Temp 97 8 °F (36 6 °C) (Tympanic)   Ht 5' 11" (1 803 m)   Wt 106 kg (233 lb)   SpO2 97%   BMI 32 50 kg/m²          Physical Exam   Constitutional: She is oriented to person, place, and time  She appears well-developed and well-nourished  No distress  Eyes: Pupils are equal, round, and reactive to light  Conjunctivae and EOM are normal  No scleral icterus  Neck: No JVD present     Cardiovascular: Normal rate, regular rhythm, normal heart sounds and intact distal pulses  Exam reveals no gallop and no friction rub  No murmur heard  Pulmonary/Chest: Effort normal and breath sounds normal  She has no wheezes  She has no rales  Abdominal: She exhibits mass  She exhibits no distension  There is no tenderness  There is no rebound and no guarding  Musculoskeletal: She exhibits no edema, tenderness or deformity  Neurological: She is alert and oriented to person, place, and time  Coordination normal    Diabetic  Foot  Exam    Absent  Vibration  Both  Feet    Position  Sense  Intact    1  Cm  Blister  Tip  Left  First  Toe    Foot  Pulses  Intact    Fungal  Nail  Bed    Nylon  Carlos   Decreased  To  Absent  Feet  Ok  Below  knee   Skin: Skin is warm and dry  No rash noted  She is not diaphoretic  No erythema  No pallor  Psychiatric: She has a normal mood and affect   Her behavior is normal

## 2018-12-08 PROCEDURE — 87070 CULTURE OTHR SPECIMN AEROBIC: CPT | Performed by: PODIATRIST

## 2018-12-08 PROCEDURE — 87205 SMEAR GRAM STAIN: CPT | Performed by: PODIATRIST

## 2018-12-10 ENCOUNTER — LAB REQUISITION (OUTPATIENT)
Dept: LAB | Facility: HOSPITAL | Age: 68
End: 2018-12-10
Payer: COMMERCIAL

## 2018-12-10 DIAGNOSIS — L02.612 CUTANEOUS ABSCESS OF LEFT FOOT: ICD-10-CM

## 2018-12-11 LAB
BACTERIA WND AEROBE CULT: NORMAL
GRAM STN SPEC: NORMAL
GRAM STN SPEC: NORMAL

## 2018-12-14 ENCOUNTER — OFFICE VISIT (OUTPATIENT)
Dept: OBGYN CLINIC | Facility: OTHER | Age: 68
End: 2018-12-14
Payer: COMMERCIAL

## 2018-12-14 VITALS
BODY MASS INDEX: 31.38 KG/M2 | DIASTOLIC BLOOD PRESSURE: 96 MMHG | WEIGHT: 225 LBS | HEART RATE: 73 BPM | SYSTOLIC BLOOD PRESSURE: 166 MMHG

## 2018-12-14 DIAGNOSIS — M17.5 OTHER SECONDARY OSTEOARTHRITIS OF LEFT KNEE: Primary | ICD-10-CM

## 2018-12-14 PROCEDURE — 20610 DRAIN/INJ JOINT/BURSA W/O US: CPT | Performed by: FAMILY MEDICINE

## 2018-12-14 RX ORDER — BUPIVACAINE HYDROCHLORIDE 2.5 MG/ML
4 INJECTION, SOLUTION INFILTRATION; PERINEURAL
Status: COMPLETED | OUTPATIENT
Start: 2018-12-14 | End: 2018-12-14

## 2018-12-14 RX ORDER — CEPHALEXIN 500 MG/1
CAPSULE ORAL
Refills: 0 | COMMUNITY
Start: 2018-12-08 | End: 2019-06-21

## 2018-12-14 RX ORDER — ATORVASTATIN CALCIUM 40 MG/1
40 TABLET, FILM COATED ORAL DAILY
Refills: 1 | COMMUNITY
Start: 2018-12-05

## 2018-12-14 RX ORDER — LISINOPRIL 10 MG/1
10 TABLET ORAL DAILY
Refills: 1 | COMMUNITY
Start: 2018-12-05 | End: 2019-06-21

## 2018-12-14 RX ORDER — TRIAMCINOLONE ACETONIDE 40 MG/ML
40 INJECTION, SUSPENSION INTRA-ARTICULAR; INTRAMUSCULAR
Status: COMPLETED | OUTPATIENT
Start: 2018-12-14 | End: 2018-12-14

## 2018-12-14 RX ADMIN — TRIAMCINOLONE ACETONIDE 40 MG: 40 INJECTION, SUSPENSION INTRA-ARTICULAR; INTRAMUSCULAR at 17:35

## 2018-12-14 RX ADMIN — BUPIVACAINE HYDROCHLORIDE 4 ML: 2.5 INJECTION, SOLUTION INFILTRATION; PERINEURAL at 17:35

## 2018-12-14 NOTE — PATIENT INSTRUCTIONS
Treatment for knee osteoarthritis depends on severity of cartilage wear and pain levels  First line for mild arthritis is exercise to strengthen the knee and allow better joint movement, 5-10% weight loss if overweight, and topical anti-inflammatories such as diclofenac gel or topical analgesic pain relief creams such as capsaicin  Symptoms at this stage usually improve in 3 months  Moderate to severe arthritis or arthritis not responding to first line treatments may require oral medicine such as anti-inflammatories (NSAIDs) such as ibuprofen/motrin, and if failing treatment with oral NSAIDs, then may consider depression medicines such as duloxetine (cymbalta) which also have been shown to work on pain receptors and help to control pain  Other analgesics such as tylenol (acetaminophen) may be used but do not appear to offer significant pain relief  Supplements such as glucosamine and chondroitin supplements  Some studies do show benefit from taking glucosamine sulfate (1500 mg/day) and chondroitin (800 mg/day) but evidence is controversial  I recommend against a type of glucosamine known as "glucosamine hydrochloride" which appears not as effective as glucosamine sulfate  If no improvement with oral medicines, then patients may consider steroid injection into the knee joint which provides pain relief  Steroid injections can be given every 3 months  Any sooner than that can  result in possible deterioration or cartilage in your joint  Other injections are available such as as viscosupplementation or gel shots into the knee which provide nutrients for the cartilage and can result in pain reduction  These viscosupplementation injections are generally considered every 6 months but are controversial   The [address] recommends against viscosupplementation injection; however, the [facility] recommends for these injections       Beyond these injections there are other controversial treatments including stem cell as well as platelet rich plasma injection which are out of pocket usually up to a 1000 dollars per injection  Lastly, if you fail conservative measures and have persistent pain, then we may consider referral to surgeon for knee replacement  (ROSIE Pedro Held 2018)  reviewed red flags with patient regarding injection including infection, skin dimpling, hypo-pigmentation, and nerve damage  patient expressed understanding and agreed to proceed with procedure  educated if any symptoms including fevers, chills, swelling, or worsening symptoms occur then to call office or go to hospital for immediate care if physician unavailable  patient expressed understanding and agreed to plan

## 2018-12-14 NOTE — PROGRESS NOTES
1  Other secondary osteoarthritis of left knee  Injection procedure prior authorization     Orders Placed This Encounter   Procedures    Large joint arthrocentesis    Injection procedure prior authorization        Imaging Studies (I personally reviewed results in PACS):    Past diagnostics:  X-ray lumbar vertebra 06/22/2018:  Mild-moderate degenerative disc disease diffuse  X-ray left knee 01/05/2018:  Severe tricompartmental arthritis  MRI left knee 01/12/2018:  Severe tricompartmental arthritis with bilateral meniscal tears    IMPRESSION:  Severe left knee osteoarthritis status post septic joint arthritis 2010  Declined referral to surgeon to consider replacement    Interventions:  Viscosupplementation Synvisc-One injection left knee-09/07/2018   Corticosteroid injection-left knee-06/22/2018  Corticosteroid injection left knee-12/14/2018    Return in about 3 months (around 3/14/2019)  Patient Instructions       Treatment for knee osteoarthritis depends on severity of cartilage wear and pain levels  First line for mild arthritis is exercise to strengthen the knee and allow better joint movement, 5-10% weight loss if overweight, and topical anti-inflammatories such as diclofenac gel or topical analgesic pain relief creams such as capsaicin  Symptoms at this stage usually improve in 3 months  Moderate to severe arthritis or arthritis not responding to first line treatments may require oral medicine such as anti-inflammatories (NSAIDs) such as ibuprofen/motrin, and if failing treatment with oral NSAIDs, then may consider depression medicines such as duloxetine (cymbalta) which also have been shown to work on pain receptors and help to control pain  Other analgesics such as tylenol (acetaminophen) may be used but do not appear to offer significant pain relief  Supplements such as glucosamine and chondroitin supplements   Some studies do show benefit from taking glucosamine sulfate (1500 mg/day) and chondroitin (800 mg/day) but evidence is controversial  I recommend against a type of glucosamine known as "glucosamine hydrochloride" which appears not as effective as glucosamine sulfate  If no improvement with oral medicines, then patients may consider steroid injection into the knee joint which provides pain relief  Steroid injections can be given every 3 months  Any sooner than that can  result in possible deterioration or cartilage in your joint  Other injections are available such as as viscosupplementation or gel shots into the knee which provide nutrients for the cartilage and can result in pain reduction  These viscosupplementation injections are generally considered every 6 months but are controversial   The [address] recommends against viscosupplementation injection; however, the [employer] [facility] recommends for these injections  Beyond these injections there are other controversial treatments including stem cell as well as platelet rich plasma injection which are out of pocket usually up to a 1000 dollars per injection  Lastly, if you fail conservative measures and have persistent pain, then we may consider referral to surgeon for knee replacement  (ROSIE Cade 2018)  reviewed red flags with patient regarding injection including infection, skin dimpling, hypo-pigmentation, and nerve damage  patient expressed understanding and agreed to proceed with procedure  educated if any symptoms including fevers, chills, swelling, or worsening symptoms occur then to call office or go to hospital for immediate care if physician unavailable  patient expressed understanding and agreed to plan  CHIEF COMPLAINT:  Follow-up left knee arthritis     HPI:  Daniella Salter is a 74 y o  female  who presents for        Visit 06/22/2018:  Patient here for follow-up left knee secondary osteoarthritis status post septic joint   Today states he feels significantly improved  He states that it is last visit in March 2018 he fell he had great relief after Synvisc as well as corticosteroid injection  He does complain of mild to moderate discomfort in his left knee but this is no where near as worse at was in the past   He does request repeat corticosteroid injection today  Patient also complains of back pain today  He states that approximately 1 month ago he strained his back  He is significantly improved  He still early has some residual stiffness and left lower aspect of his back  He denies any pain radiating down his leg  Denies any numbness and tingling  He denies any weakness  His discomfort is intermittent exacerbated by certain positions only mild intensity  09/07/2018: Follow-up left knee osteoarthritis  Uncontrolled  Interval worsening since last visit  No swelling erythema or increased warmth  Source intermittent medial joint line  Last visit 06/22/2018 given corticosteroid injection with significant improvement until approximately 2-3 weeks ago  12/14/2018: Follow-up left knee significant osteoarthritis status post septic joint 2010:  Uncontrolled  Patient states has had significant relief with corticosteroid injections in the past but only mild to moderate relief with viscosupplementation performed palpation guided  Denies any injury  Review of Systems   Constitutional: Negative for chills and fever  Neurological: Negative for weakness and numbness           Following history reviewed and update:    Past Medical History:   Diagnosis Date    Diabetes ([address] )     type 2    Hypertension      Past Surgical History:   Procedure Laterality Date    HERNIA REPAIR  2013    KNEE SURGERY      NECK SURGERY      basil cell carcinoma      Social History   History   Alcohol Use    Yes     Comment: occasional     History   Drug Use No     History   Smoking Status    Never Smoker   Smokeless Tobacco    Never Used     Family History Problem Relation Age of Onset    Aortic stenosis Mother     No Known Problems Father     Diabetes Paternal Grandfather      No Known Allergies       Physical Exam   Constitutional:  see vital signs  Gen: well-developed, normocephalic/atraumatic, well-groomed  Eyes: No inflammation or discharge of conjunctiva or lids; sclera clear   Pharynx: no inflammation, lesion, or mass of lips  Neck: supple, no masses, non-distended  MSK: no inflammation, lesion, mass, or clubbing of nails and digits except for other than mentioned below  SKIN: no visible rashes or skin lesions  Pulmonary/Chest: Effort normal  No respiratory distress  NEURO: cranial nerves grossly intact  PSYCH:  Alert and oriented to person, place, and time; recent and remote memory intact; mood normal, no depression, anxiety, or agitation, judgment and insight good and intact        LEFT KNEE:  Erythema: no  Swelling: no  Increased Warmth: no  Tenderness: + medial joint line    Large joint arthrocentesis  Date/Time: 12/14/2018 5:35 PM  Consent given by: patient  Site marked: site marked  Timeout: Immediately prior to procedure a time out was called to verify the correct patient, procedure, equipment, support staff and site/side marked as required   Supporting Documentation  Indications: pain   Procedure Details  Location: knee - L knee  Preparation: Patient was prepped and draped in the usual sterile fashion (betadine if not allergic   alcohol before and after ethyl chloride spray)  Needle size: 22 G  Ultrasound guidance: no  Approach: anterolateral  Medications administered: 40 mg triamcinolone acetonide 40 mg/mL; 4 mL bupivacaine 0 25 %    Patient tolerance: patient tolerated the procedure well with no immediate complications  Dressing:  Sterile dressing applied

## 2018-12-17 RX ORDER — SODIUM PICOSULFATE, MAGNESIUM OXIDE, AND ANHYDROUS CITRIC ACID 10; 3.5; 12 MG/160ML; G/160ML; G/160ML
LIQUID ORAL
Refills: 0 | COMMUNITY
Start: 2018-12-13 | End: 2019-02-17 | Stop reason: ALTCHOICE

## 2018-12-19 ENCOUNTER — TELEPHONE (OUTPATIENT)
Dept: FAMILY MEDICINE CLINIC | Facility: CLINIC | Age: 68
End: 2018-12-19

## 2018-12-19 ENCOUNTER — OFFICE VISIT (OUTPATIENT)
Dept: FAMILY MEDICINE CLINIC | Facility: CLINIC | Age: 68
End: 2018-12-19
Payer: COMMERCIAL

## 2018-12-19 VITALS
OXYGEN SATURATION: 97 % | RESPIRATION RATE: 17 BRPM | BODY MASS INDEX: 32.06 KG/M2 | HEART RATE: 82 BPM | DIASTOLIC BLOOD PRESSURE: 94 MMHG | TEMPERATURE: 96.4 F | HEIGHT: 71 IN | SYSTOLIC BLOOD PRESSURE: 152 MMHG | WEIGHT: 229 LBS

## 2018-12-19 DIAGNOSIS — Z12.11 SCREENING FOR COLON CANCER: ICD-10-CM

## 2018-12-19 DIAGNOSIS — N18.30 CKD STAGE 3 DUE TO TYPE 2 DIABETES MELLITUS (HCC): Primary | ICD-10-CM

## 2018-12-19 DIAGNOSIS — Z79.4 TYPE 2 DIABETES MELLITUS WITHOUT COMPLICATION, WITH LONG-TERM CURRENT USE OF INSULIN (HCC): ICD-10-CM

## 2018-12-19 DIAGNOSIS — E11.9 TYPE 2 DIABETES MELLITUS WITHOUT COMPLICATION, WITH LONG-TERM CURRENT USE OF INSULIN (HCC): ICD-10-CM

## 2018-12-19 DIAGNOSIS — E78.2 MIXED HYPERLIPIDEMIA: ICD-10-CM

## 2018-12-19 DIAGNOSIS — I10 ESSENTIAL HYPERTENSION: ICD-10-CM

## 2018-12-19 DIAGNOSIS — E11.22 CKD STAGE 3 DUE TO TYPE 2 DIABETES MELLITUS (HCC): Primary | ICD-10-CM

## 2018-12-19 PROCEDURE — 3008F BODY MASS INDEX DOCD: CPT | Performed by: SPECIALIST

## 2018-12-19 PROCEDURE — 3725F SCREEN DEPRESSION PERFORMED: CPT | Performed by: SPECIALIST

## 2018-12-19 PROCEDURE — 1160F RVW MEDS BY RX/DR IN RCRD: CPT | Performed by: SPECIALIST

## 2018-12-19 PROCEDURE — 99213 OFFICE O/P EST LOW 20 MIN: CPT | Performed by: SPECIALIST

## 2018-12-19 PROCEDURE — 1101F PT FALLS ASSESS-DOCD LE1/YR: CPT | Performed by: SPECIALIST

## 2018-12-19 NOTE — TELEPHONE ENCOUNTER
Care Everywhere Results:      Impression: Normal examination  Workstation:[id number]   Result Narrative   Examination: Renal ultrasound    Indication: Chronic renal disease, stage IV    Comparison: 12/13/2010  Findings: The prevoid bladder volume measures 202 mL  No focal bladder masses  Normal urine jets  104 mL postvoid residual     The right kidney measures 10 3 cm in length  Corticomedullary echotexture is  normal  No evidence of renal calculus, mass, or hydronephrosis  The left kidney measures 10 8 cm in length  The corticomedullary echotexture is  normal  No evidence of renal calculus, mass, or hydronephrosis  Other Result Information   Interface, Rad Results In - 12/09/2018  3:03 PM EST  Examination: Renal ultrasound    Indication: Chronic renal disease, stage IV    Comparison: 12/13/2010  Findings: The prevoid bladder volume measures 202 mL  No focal bladder masses  Normal urine jets  104 mL postvoid residual     The right kidney measures 10 3 cm in length  Corticomedullary echotexture is  normal  No evidence of renal calculus, mass, or hydronephrosis  The left kidney measures 10 8 cm in length  The corticomedullary echotexture is  normal  No evidence of renal calculus, mass, or hydronephrosis  IMPRESSION:  Impression: Normal examination              [id number]

## 2018-12-19 NOTE — PATIENT INSTRUCTIONS
Plan  To take   5  Units  novalog  With  Meals        Continue  lantus    Do lab  1  Week  Before  Visit

## 2018-12-19 NOTE — PROGRESS NOTES
Assessment/Plan    Pt is  On  Asa     Statin   Ace   lantus  And  Added  Novalog      Saw  Renal         Lab  In  2  Mos              Diagnoses and all orders for this visit:    CKD stage 3 due to type 2 diabetes mellitus ([facility] [address] )  -     CBC and differential; Future  -     Comprehensive metabolic panel; Future  -     Gamma GT; Future  -     Lipid panel; Future  -     Magnesium; Future  -     UA w Reflex to Microscopic w Reflex to Culture -Lab Collect  -     Microalbumin / creatinine urine ratio  -     Protein / creatinine ratio, urine    Screening for colon cancer  -     Ambulatory referral to Gastroenterology; Future  -     CBC and differential; Future  -     Comprehensive metabolic panel; Future  -     Gamma GT; Future  -     Lipid panel; Future  -     Magnesium; Future  -     UA w Reflex to Microscopic w Reflex to Culture -Lab Collect  -     Microalbumin / creatinine urine ratio  -     Protein / creatinine ratio, urine    Essential hypertension  -     CBC and differential; Future  -     Comprehensive metabolic panel; Future  -     Gamma GT; Future  -     Lipid panel; Future  -     Magnesium; Future  -     UA w Reflex to Microscopic w Reflex to Culture -Lab Collect  -     Microalbumin / creatinine urine ratio  -     Protein / creatinine ratio, urine    Type 2 diabetes mellitus without complication, with long-term current use of insulin (HCC)  -     CBC and differential; Future  -     Comprehensive metabolic panel; Future  -     Gamma GT; Future  -     Lipid panel; Future  -     Magnesium; Future  -     UA w Reflex to Microscopic w Reflex to Culture -Lab Collect  -     Microalbumin / creatinine urine ratio  -     Protein / creatinine ratio, urine  -     HEMOGLOBIN A1C W/ EAG ESTIMATION; Future  -     insulin aspart (NOVOLOG FLEXPEN) 100 Units/mL injection pen;  Inject 5 Units under the skin 3 (three) times a day with meals    Mixed hyperlipidemia  -     CBC and differential; Future  -     Comprehensive metabolic panel; Future  -     Gamma GT; Future  -     Lipid panel; Future  -     Magnesium; Future  -     UA w Reflex to Microscopic w Reflex to Culture -Lab Collect  -     Microalbumin / creatinine urine ratio  -     Protein / creatinine ratio, urine    Other orders  -     CLENPIQ 10-3 5-12 MG-GM -GM/160ML SOLN; TAKE AS DIRECTED BY PHYSICIAN          Subjective:      Patient ID: Elle Grey is a 74 y o  female  73 Yo  dm2  On  Insulin    ckd   #3        The following portions of the patient's history were reviewed and updated as appropriate: allergies, current medications, past family history, past medical history, past social history, past surgical history and problem list     Review of Systems   Constitutional: Negative for activity change, appetite change, chills, diaphoresis, fatigue and fever  HENT: Negative for sinus pressure and sneezing  Eyes: Negative for visual disturbance  Respiratory: Negative for cough, chest tightness and wheezing  Cardiovascular: Negative for chest pain and palpitations  Gastrointestinal: Negative for abdominal distention and abdominal pain  Genitourinary: Negative for difficulty urinating  Musculoskeletal: Negative for arthralgias, back pain, gait problem, joint swelling, myalgias and neck pain  Skin: Negative  Neurological: Negative for dizziness, tremors, seizures, syncope, facial asymmetry, speech difficulty, light-headedness, numbness and headaches  Psychiatric/Behavioral: Negative for agitation  Objective:      /94 (BP Location: Right arm, Patient Position: Sitting, Cuff Size: Adult)   Pulse 82   Temp (!) 96 4 °F (35 8 °C) (Tympanic)   Resp 17   Ht 5' 11" (1 803 m)   Wt 104 kg (229 lb)   SpO2 97%   BMI 31 94 kg/m²          Physical Exam   Constitutional: She is oriented to person, place, and time  No distress     HENT:   Right Ear: External ear normal    Left Ear: External ear normal    Mouth/Throat: Oropharynx is clear and moist    Eyes: Right eye exhibits no discharge  Left eye exhibits no discharge  No scleral icterus  Neck: No JVD present  Cardiovascular: Normal rate, regular rhythm, normal heart sounds and intact distal pulses  No murmur heard  Pulmonary/Chest: She has no wheezes  She has no rales  Abdominal: She exhibits no distension  There is no tenderness  Musculoskeletal: She exhibits deformity  She exhibits no edema  Lymphadenopathy:     She has no cervical adenopathy  Neurological: She is alert and oriented to person, place, and time  Skin: Skin is warm and dry  She is not diaphoretic  Psychiatric: She has a normal mood and affect   Her behavior is normal

## 2018-12-20 DIAGNOSIS — N18.30 CKD STAGE 3 DUE TO TYPE 2 DIABETES MELLITUS (HCC): Primary | ICD-10-CM

## 2018-12-20 DIAGNOSIS — E11.22 CKD STAGE 3 DUE TO TYPE 2 DIABETES MELLITUS (HCC): Primary | ICD-10-CM

## 2018-12-20 DIAGNOSIS — Z79.4 TYPE 2 DIABETES MELLITUS WITH COMPLICATION, WITH LONG-TERM CURRENT USE OF INSULIN (HCC): ICD-10-CM

## 2018-12-20 DIAGNOSIS — E11.8 TYPE 2 DIABETES MELLITUS WITH COMPLICATION, WITH LONG-TERM CURRENT USE OF INSULIN (HCC): ICD-10-CM

## 2019-02-17 ENCOUNTER — APPOINTMENT (OUTPATIENT)
Dept: RADIOLOGY | Facility: MEDICAL CENTER | Age: 69
End: 2019-02-17
Payer: COMMERCIAL

## 2019-02-17 ENCOUNTER — OFFICE VISIT (OUTPATIENT)
Dept: URGENT CARE | Facility: MEDICAL CENTER | Age: 69
End: 2019-02-17
Payer: COMMERCIAL

## 2019-02-17 ENCOUNTER — TELEPHONE (OUTPATIENT)
Dept: URGENT CARE | Facility: MEDICAL CENTER | Age: 69
End: 2019-02-17

## 2019-02-17 ENCOUNTER — HOSPITAL ENCOUNTER (OUTPATIENT)
Dept: RADIOLOGY | Facility: HOSPITAL | Age: 69
Discharge: HOME | End: 2019-02-17

## 2019-02-17 VITALS
TEMPERATURE: 96.8 F | HEART RATE: 82 BPM | DIASTOLIC BLOOD PRESSURE: 82 MMHG | SYSTOLIC BLOOD PRESSURE: 140 MMHG | OXYGEN SATURATION: 98 % | RESPIRATION RATE: 20 BRPM

## 2019-02-17 DIAGNOSIS — M54.6 ACUTE LEFT-SIDED THORACIC BACK PAIN: ICD-10-CM

## 2019-02-17 DIAGNOSIS — S22.42XA CLOSED FRACTURE OF MULTIPLE RIBS OF LEFT SIDE, INITIAL ENCOUNTER: Primary | ICD-10-CM

## 2019-02-17 DIAGNOSIS — M54.9 MUSCULOSKELETAL BACK PAIN: Primary | ICD-10-CM

## 2019-02-17 PROCEDURE — 71101 X-RAY EXAM UNILAT RIBS/CHEST: CPT

## 2019-02-17 PROCEDURE — S9088 SERVICES PROVIDED IN URGENT: HCPCS | Performed by: PHYSICIAN ASSISTANT

## 2019-02-17 PROCEDURE — 72072 X-RAY EXAM THORAC SPINE 3VWS: CPT

## 2019-02-17 PROCEDURE — 99213 OFFICE O/P EST LOW 20 MIN: CPT | Performed by: PHYSICIAN ASSISTANT

## 2019-02-17 RX ORDER — METHOCARBAMOL 750 MG/1
750 TABLET, FILM COATED ORAL EVERY 6 HOURS PRN
Qty: 15 TABLET | Refills: 0 | Status: SHIPPED | OUTPATIENT
Start: 2019-02-17 | End: 2019-06-21

## 2019-02-17 RX ORDER — HYDROCODONE BITARTRATE AND ACETAMINOPHEN 5; 325 MG/1; MG/1
1 TABLET ORAL EVERY 6 HOURS PRN
Qty: 15 TABLET | Refills: 0 | Status: SHIPPED | OUTPATIENT
Start: 2019-02-17 | End: 2019-06-21

## 2019-02-17 NOTE — PATIENT INSTRUCTIONS
Use Robaxin as needed for pain control   Tylenol as needed for pain control  Sore he as needed  Follow up with PCP in 3-5 days  Proceed to  ER if symptoms worsen  Musculoskeletal Pain   WHAT YOU NEED TO KNOW:   Muscle pain can be dull, achy, or sharp  You may have pain and tenderness to the touch as well  It can occur anywhere on your body and is often brought on by exercise  Muscle pain may occur from an injury, such as a sprain, tendonitis, or bone fracture  Muscle pain can also be the result of medical conditions, such as polymyositis, fibromyalgia, and connective tissue disorders  DISCHARGE INSTRUCTIONS:   Self care:   · Rest  as directed and avoid activity that causes pain  You may be able to return to normal activity when you can move without pain  Follow directions for rest and activity  You are at risk for injury for 3 weeks after your symptoms go away  · Ice  your painful muscle area to decrease pain and swelling  Use an ice pack, or put ice in a plastic bag and cover it with a towel  Always  put a cloth between the ice and your skin  Apply the ice as often as directed for the first 24 to 48 hours  · Compression  with a splint, brace, or elastic bandage helps decrease pain and swelling  This may be needed for muscle pain in arms or legs  A splint, brace, or bandage will also help protect the painful area when you move around  · Elevate  a painful arm or leg to reduce swelling and pain  Elevate your limb while you are sitting or lying down  Prop a painful leg on pillows to keep it above the level of your heart  Medicines:   · NSAIDs  help decrease swelling and pain or fever  This medicine is available with or without a doctor's order  NSAIDs can cause stomach bleeding or kidney problems in certain people  If you take blood thinner medicine, always ask your healthcare provider if NSAIDs are safe for you  Always read the medicine label and follow directions      · Acetaminophen  is used to decrease pain  It is available without a doctor's order  Ask your healthcare provider how much to take and when to take it  Follow directions  Acetaminophen can cause liver damage if not taken correctly  Do not take more than one medicine that contains acetaminophen unless directed  · Muscle relaxers  help relax your muscles to decrease pain and muscle spasms  · Steroids  may be given to decrease redness, pain, and swelling  · Take your medicine as directed  Contact your healthcare provider if you think your medicine is not helping or if you have side effects  Tell him if you are allergic to any medicine  Keep a list of the medicines, vitamins, and herbs you take  Include the amounts, and when and why you take them  Bring the list or the pill bottles to follow-up visits  Carry your medicine list with you in case of an emergency  Follow up with your healthcare provider as directed: You may need more tests to help healthcare providers find the cause of your muscle pain  You may need physical therapy to learn muscle strengthening exercises  Write down your questions so you remember to ask them during your visits  Contact your healthcare provider if:   · You have a fever  · You have trouble sleeping because of your pain  · Your painful area becomes more tender, red, and warm to the touch  · You have decreased movement of the painful area  · You have questions or concerns about your condition or care  Return to the emergency department if:   · You have increased severe pain when you move the painful muscle area  · You lose feeling in your painful muscle area  · You have new or worse swelling in the painful area  Your skin may feel tight  · You have increased muscle pain or pain that does not improve with treatment  © 2017 [address] Information is for End User's use only and may not be sold, redistributed or otherwise used for commercial purposes   All illustrations and images included in CareNotes® are the copyrighted property of [employer]  or [employer]var[employer]  The above information is an  only  It is not intended as medical advice for individual conditions or treatments  Talk to your doctor, nurse or pharmacist before following any medical regimen to see if it is safe and effective for you  Muscle Strain   AMBULATORY CARE:   A muscle strain  is a twist, pull, or tear of a muscle or tendon  A tendon is a strong elastic tissue that connects a muscle to a bone  Common symptoms include the following:   · Bruised skin on the area of your injured muscle    · Muscle soreness, cramps, or spasms    · Little or stiff muscle movement, or loss of muscle strength    · Swelling in the area of the injury    · Muscle pain that gets worse with activity, or pain that moves or spreads to another body area    · Crepitus (crackling sound or grating feeling) when you move your muscle  Seek immediate care for the following symptoms:   · Sudden loss of feeling or movement in your injured muscle    Contact your healthcare provider if:   · Your pain and swelling worsen or do not go away  · You have questions or concerns about your condition or care  Treatment for a muscle strain  may include any of the following:  · NSAIDs , such as ibuprofen, help decrease swelling, pain, and fever  This medicine is available with or without a doctor's order  NSAIDs can cause stomach bleeding or kidney problems in certain people  If you take blood thinner medicine, always ask your healthcare provider if NSAIDs are safe for you  Always read the medicine label and follow directions  · Muscle relaxers  help decrease pain and muscle spasms, and relax your muscles  · Steroid medicine  may be given to decrease pain and inflammation  · Local anesthetic  is medicine used to numb the area for a short time   This is often used if you have a muscle strain in your back     · Physical therapy exercises  may help improve movement and decrease pain  Physical therapy can also help improve strength and decrease your risk for loss of function  · Surgery  may be needed if your muscle strain does not heal after 6 months of treatment  Surgery may be done to drain blood that has pooled in your muscle  If your tendon was torn off of the bone, it may be put back with surgery  Manage your symptoms:   · Rest your muscle  to allow your injury to heal  When the pain decreases, begin normal, slow movements  For mild and moderate muscle strains, rest your muscles for about 2 days  Rest for 10 to 14 days if you have a severe muscle strain  You may need to use crutches if your muscle strain is in your legs  · Apply ice on your injured area  for 15 to 20 minutes every hour or as directed  Use an ice pack, or put crushed ice in a plastic bag  Cover it with a towel  Ice helps prevent tissue damage and decreases swelling and pain  · Use an elastic bandage as directed  Wrap the bandage around the area to decrease swelling  It should be snug, but not too tight  · Elevate your injured muscle  above the level of your heart as often as you can  This will help decrease swelling and pain  Prop your injured muscle on pillows or blankets to keep it elevated comfortably  · Stretch and strengthen your muscles each day  Stretch for about 30 seconds, 4 times a day  Stretch the muscle until you feel a slight pull  Stop stretching if you feel pain  Start to exercise and strengthen your muscles slowly  Increase the time and amount you exercise  Prevent another muscle strain:   · Always wear proper shoes when you play sports  Replace your old running shoes with new ones often if you are a runner  Use shoe inserts or arch supports to correct leg or foot problems  Ask your healthcare provider for more information on shoe supports  · Do warm up and cool down exercises    Do stretching exercises before you work out or do sports activities  These exercises will help loosen and decrease stress on your muscles  Cool down and stretch after your workout  Do not stop and rest after a workout without cooling down first            · Keep your muscles strong with strength training exercises  Exercises such as weight lifting and stretching exercises help keep your muscles flexible and strong  A physical therapist or  may help you with these exercises  · Slowly start your exercise or sports training program   Follow your healthcare provider's directions on when to start exercising  Slowly increase time, distance, and how often you train  Sudden increases in how often you train may cause you to injure your muscle again  Follow up with your healthcare provider as directed:  Write down your questions so you remember to ask them during your visits  © 2017 [facility] [place] [facility] [place] Information is for End User's use only and may not be sold, redistributed or otherwise used for commercial purposes  All illustrations and images included in CareNotes® are the copyrighted property of [employer]  or [employer]  The above information is an  only  It is not intended as medical advice for individual conditions or treatments  Talk to your doctor, nurse or pharmacist before following any medical regimen to see if it is safe and effective for you

## 2019-02-17 NOTE — TELEPHONE ENCOUNTER
Contacted patient  Discussed radiology results with patient  Patient understood  He will  script for pain medicine tomorrow  Advised him to make sure he is taking deep breaths several times every hour    Advised the follow-up with family doctor and couple days if any thing happens refills worse to go to the ER

## 2019-02-17 NOTE — PROGRESS NOTES
[address]        NAME: Guru Davis is a 79 y o  adult  : 1950    MRN: [id number]  DATE: 2019  TIME: 10:19 AM    Assessment and Plan   Musculoskeletal back pain [M54 9]  1  Musculoskeletal back pain  XR ribs left w pa chest min 3 views    XR spine thoracic 3 vw    methocarbamol (ROBAXIN) 750 mg tablet         Patient Instructions     Use Robaxin as needed for pain control  Motrin and/or Tylenol as needed for pain control  Sore he as needed  Follow up with PCP in 3-5 days  Proceed to  ER if symptoms worsen  Chief Complaint     Chief Complaint   Patient presents with    Back Pain     Pt states slipped and fell onto ice landing on left side  Denies hitting head  Complaining of left lower rib pain  Worse with bending and inspiration  History of Present Illness       28-year-old male presents with a left thoracic back injury  Patient reports he had a slip and fall injury onto the left thoracic area  Has been having pain ever since then  Patient fell yesterday  This morning when he woke up was very sore and stiff  Denies any shortness of breath fevers chills  No other injuries complaints    Fall   The accident occurred 12 to 24 hours ago  The fall occurred while walking  He fell from a height of 1 to 2 ft  He landed on concrete  There was no blood loss  Point of impact: Left thoracic back area  The pain is present in the back  The pain is moderate  The symptoms are aggravated by ambulation, movement and rotation  Pertinent negatives include no abdominal pain, bowel incontinence, fever, hearing loss, numbness, tingling, visual change or vomiting  He has tried nothing for the symptoms  The treatment provided no relief  Review of Systems   Review of Systems   Constitutional: Negative  Negative for fever  HENT: Negative  Eyes: Negative  Respiratory: Negative  Cardiovascular: Negative  Gastrointestinal: Negative    Negative for abdominal pain, bowel incontinence and vomiting  Musculoskeletal: Positive for back pain  Skin: Negative  Neurological: Negative  Negative for tingling and numbness  Current Medications       Current Outpatient Medications:     atorvastatin (LIPITOR) 40 mg tablet, Take 40 mg by mouth daily, Disp: , Rfl: 1    insulin aspart (NOVOLOG FLEXPEN) 100 Units/mL injection pen, Inject 5 Units under the skin 3 (three) times a day with meals, Disp: 5 pen, Rfl: 0    LANTUS SOLOSTAR injection pen 100 units/mL, INJEC 22 UNITS BY SUBCUTANEOUS ROUTE DAILY, Disp: , Rfl: 6    lisinopril (ZESTRIL) 10 mg tablet, Take 10 mg by mouth daily, Disp: , Rfl: 1    aspirin 81 mg chewable tablet, as needed  , Disp: , Rfl:     cephalexin (KEFLEX) 500 mg capsule, TAKE 1 CAPSULE BY MOUTH THREE TIMES A DAY FOR 10 DAYS, Disp: , Rfl: 0    FLUZONE HIGH-DOSE 0 5 ML JAZMÍN, TO BE ADMINISTERED BY PHARMACIST FOR IMMUNIZATION, Disp: , Rfl: 0    glucose blood (RELION ULTIMA TEST) test strip, Check glucose TID, Disp: 100 each, Rfl: 2    methocarbamol (ROBAXIN) 750 mg tablet, Take 1 tablet (750 mg total) by mouth every 6 (six) hours as needed for muscle spasms, Disp: 15 tablet, Rfl: 0    Current Allergies     Allergies as of 02/17/2019    (No Known Allergies)            The following portions of the patient's history were reviewed and updated as appropriate: allergies, current medications, past family history, past medical history, past social history, past surgical history and problem list      Past Medical History:   Diagnosis Date    Diabetes ([address] )     type 2    Hypertension        Past Surgical History:   Procedure Laterality Date    HERNIA REPAIR  2013    KNEE SURGERY      NECK SURGERY      basil cell carcinoma        Family History   Problem Relation Age of Onset    Aortic stenosis Mother     No Known Problems Father     Diabetes Paternal Grandfather          Medications have been verified          Objective   /82 (BP Location: Right arm, Patient Position: Sitting, Cuff Size: Standard)   Pulse 82   Temp (!) 96 8 °F (36 °C) (Tympanic)   Resp 20   SpO2 98%        Physical Exam     Physical Exam   Constitutional: He is oriented to person, place, and time  He appears well-developed and well-nourished  No distress  HENT:   Head: Normocephalic and atraumatic  Right Ear: External ear normal    Left Ear: External ear normal    Nose: Nose normal    Mouth/Throat: Oropharynx is clear and moist  No oropharyngeal exudate  Eyes: Conjunctivae are normal  Right eye exhibits no discharge  Left eye exhibits no discharge  Neck: Normal range of motion  Neck supple  Cardiovascular: Normal rate, regular rhythm, normal heart sounds and intact distal pulses  No murmur heard  Pulmonary/Chest: Effort normal and breath sounds normal  No respiratory distress  He has no wheezes  He has no rales  Abdominal: Soft  Bowel sounds are normal  There is no tenderness  Musculoskeletal: Normal range of motion  Thoracic back: He exhibits tenderness and pain  He exhibits normal range of motion, no bony tenderness, no swelling, no edema, no deformity, no laceration, no spasm and normal pulse  Back:    Lymphadenopathy:     He has no cervical adenopathy  Neurological: He is alert and oriented to person, place, and time  Skin: Skin is warm and dry  Psychiatric: He has a normal mood and affect  Nursing note and vitals reviewed  X-rays reviewed    No fracture noted

## 2019-02-18 DIAGNOSIS — E11.9 TYPE 2 DIABETES MELLITUS WITHOUT COMPLICATION, WITH LONG-TERM CURRENT USE OF INSULIN (HCC): Primary | ICD-10-CM

## 2019-02-18 DIAGNOSIS — Z79.4 TYPE 2 DIABETES MELLITUS WITHOUT COMPLICATION, WITH LONG-TERM CURRENT USE OF INSULIN (HCC): Primary | ICD-10-CM

## 2019-02-18 RX ORDER — INSULIN GLARGINE 100 [IU]/ML
INJECTION, SOLUTION SUBCUTANEOUS
Qty: 9 PEN | Refills: 4 | Status: SHIPPED | OUTPATIENT
Start: 2019-02-18 | End: 2019-12-20 | Stop reason: SDUPTHER

## 2019-02-22 DIAGNOSIS — Z79.4 TYPE 2 DIABETES MELLITUS WITHOUT COMPLICATION, WITH LONG-TERM CURRENT USE OF INSULIN (HCC): ICD-10-CM

## 2019-02-22 DIAGNOSIS — E11.22 CKD STAGE 3 DUE TO TYPE 2 DIABETES MELLITUS (HCC): ICD-10-CM

## 2019-02-22 DIAGNOSIS — Z79.4 TYPE 2 DIABETES MELLITUS WITH COMPLICATION, WITH LONG-TERM CURRENT USE OF INSULIN (HCC): ICD-10-CM

## 2019-02-22 DIAGNOSIS — E11.8 TYPE 2 DIABETES MELLITUS WITH COMPLICATION, WITH LONG-TERM CURRENT USE OF INSULIN (HCC): ICD-10-CM

## 2019-02-22 DIAGNOSIS — E11.9 TYPE 2 DIABETES MELLITUS WITHOUT COMPLICATION, WITH LONG-TERM CURRENT USE OF INSULIN (HCC): ICD-10-CM

## 2019-02-22 DIAGNOSIS — N18.30 CKD STAGE 3 DUE TO TYPE 2 DIABETES MELLITUS (HCC): ICD-10-CM

## 2019-02-22 LAB
CREAT ?TM UR-SCNC: 70.3 UMOL/L
EXT PROTEIN URINE: 47
PROT/CREAT UR: 0.67 MG/G{CREAT}

## 2019-02-22 NOTE — TELEPHONE ENCOUNTER
Patient called and requested a medication refill for LANTUS SOLOSTAR 100 units/mL injection pen - INJEC 22 UNITS BY SUBCUTANEOUS ROUTE DAILY #15  He also requested a refill for One Touch Ultra test strips and Reli On Ultima Blood Glucose Meter and test strips    He would like a hard copy on these 2 so that he could have on hand

## 2019-02-28 ENCOUNTER — TELEPHONE (OUTPATIENT)
Dept: FAMILY MEDICINE CLINIC | Facility: CLINIC | Age: 69
End: 2019-02-28

## 2019-02-28 NOTE — TELEPHONE ENCOUNTER
Shyann Morales came in to drop off a copy of his Colonoscopy that was recently done(it was placed on Dr Jennifer Johnson desk)  He would like a recommendation for a Gastro Surgeon  He also would like a script for his glucose monitor strips-he would like to get a script for this  He needs either a script for Reli On Ultima test strips or One Touch Ultra   If you have any questions about his test strips, the patient can be reached [phone]

## 2019-03-04 ENCOUNTER — OFFICE VISIT (OUTPATIENT)
Dept: FAMILY MEDICINE CLINIC | Facility: CLINIC | Age: 69
End: 2019-03-04
Payer: COMMERCIAL

## 2019-03-04 ENCOUNTER — TELEPHONE (OUTPATIENT)
Dept: FAMILY MEDICINE CLINIC | Facility: CLINIC | Age: 69
End: 2019-03-04

## 2019-03-04 VITALS
RESPIRATION RATE: 16 BRPM | HEART RATE: 80 BPM | DIASTOLIC BLOOD PRESSURE: 80 MMHG | TEMPERATURE: 97.6 F | BODY MASS INDEX: 32.36 KG/M2 | SYSTOLIC BLOOD PRESSURE: 124 MMHG | WEIGHT: 232 LBS | OXYGEN SATURATION: 97 %

## 2019-03-04 DIAGNOSIS — R80.1 PERSISTENT PROTEINURIA: ICD-10-CM

## 2019-03-04 DIAGNOSIS — C18.9 ADENOCARCINOMA, COLON (HCC): ICD-10-CM

## 2019-03-04 DIAGNOSIS — Z11.59 NEED FOR HEPATITIS C SCREENING TEST: ICD-10-CM

## 2019-03-04 DIAGNOSIS — E11.9 TYPE 2 DIABETES MELLITUS WITHOUT COMPLICATION, WITH LONG-TERM CURRENT USE OF INSULIN (HCC): Primary | ICD-10-CM

## 2019-03-04 DIAGNOSIS — Z79.4 TYPE 2 DIABETES MELLITUS WITHOUT COMPLICATION, WITH LONG-TERM CURRENT USE OF INSULIN (HCC): Primary | ICD-10-CM

## 2019-03-04 DIAGNOSIS — N18.30 CKD (CHRONIC KIDNEY DISEASE), STAGE III (HCC): ICD-10-CM

## 2019-03-04 DIAGNOSIS — G62.9 PERIPHERAL POLYNEUROPATHY: ICD-10-CM

## 2019-03-04 PROCEDURE — 3066F NEPHROPATHY DOC TX: CPT | Performed by: SPECIALIST

## 2019-03-04 PROCEDURE — 99214 OFFICE O/P EST MOD 30 MIN: CPT | Performed by: SPECIALIST

## 2019-03-04 NOTE — PROGRESS NOTES
Assessment/Plan:    Increase  The  lantus   4  Units   Since  fbs   190  Range   No  hypoglycemmia    See  Colon  Rectall  Specialist    See  Retinal  Specialist    See  podiatry         Diagnoses and all orders for this visit:    Type 2 diabetes mellitus without complication, with long-term current use of insulin (HCC)  -     POCT hemoglobin A1c  -     CBC and differential; Future  -     Comprehensive metabolic panel; Future  -     Lipid panel; Future  -     Iron; Future  -     Iron Saturation %; Future  -     Occult Blood, Fecal Immunochemical; Future  -     UA w Reflex to Microscopic w Reflex to Culture -Lab Collect  -     Microalbumin / creatinine urine ratio  -     Protein / creatinine ratio, urine  -     Phosphorus; Future  -     CEA; Future    Need for hepatitis C screening test  -     Hepatitis C antibody; Future  -     CBC and differential; Future  -     Comprehensive metabolic panel; Future  -     Lipid panel; Future  -     Iron; Future  -     Iron Saturation %; Future  -     Occult Blood, Fecal Immunochemical; Future  -     UA w Reflex to Microscopic w Reflex to Culture -Lab Collect  -     Microalbumin / creatinine urine ratio  -     Protein / creatinine ratio, urine  -     Phosphorus; Future  -     CEA; Future  -     Hepatitis C antibody; Future    CKD (chronic kidney disease), stage III (HCC)  -     CBC and differential; Future  -     Comprehensive metabolic panel; Future  -     Lipid panel; Future  -     Iron; Future  -     Iron Saturation %; Future  -     Occult Blood, Fecal Immunochemical; Future  -     UA w Reflex to Microscopic w Reflex to Culture -Lab Collect  -     Microalbumin / creatinine urine ratio  -     Protein / creatinine ratio, urine  -     Phosphorus; Future  -     CEA; Future    Persistent proteinuria  -     CBC and differential; Future  -     Comprehensive metabolic panel; Future  -     Lipid panel; Future  -     Iron; Future  -     Iron Saturation %;  Future  -     Occult Blood, Fecal Immunochemical; Future  -     UA w Reflex to Microscopic w Reflex to Culture -Lab Collect  -     Microalbumin / creatinine urine ratio  -     Protein / creatinine ratio, urine  -     Phosphorus; Future  -     CEA; Future    Peripheral polyneuropathy  -     CBC and differential; Future  -     Comprehensive metabolic panel; Future  -     Lipid panel; Future  -     Iron; Future  -     Iron Saturation %; Future  -     Occult Blood, Fecal Immunochemical; Future  -     UA w Reflex to Microscopic w Reflex to Culture -Lab Collect  -     Microalbumin / creatinine urine ratio  -     Protein / creatinine ratio, urine  -     Phosphorus; Future  -     CEA; Future    Adenocarcinoma, colon (HCC)  -     CBC and differential; Future  -     Comprehensive metabolic panel; Future  -     Lipid panel; Future  -     Iron; Future  -     Iron Saturation %; Future  -     Occult Blood, Fecal Immunochemical; Future  -     UA w Reflex to Microscopic w Reflex to Culture -Lab Collect  -     Microalbumin / creatinine urine ratio  -     Protein / creatinine ratio, urine  -     Phosphorus; Future  -     CEA; Future          Subjective:      Patient ID: Alana Membreno is a 79 y o  adult      78 Yo  Male  With    dm2   Since  Age  50      Now  On  lantus  And  novalog   Following  Diet        a1c  Down  To   8 4    Av    194     Can increase  The  lantus  To   29   And    Keep  The  novalog       5  To  8  Units    Ac    10 units  If  Pasta  Or  Rice       Goal   a1c   <7   %    Peripheral  Neuropathy      Position  Sense   wnl     Vibration  Absent    Minimal if  Any   sen[place] to  Light  Touch  On plantar  Aspect  Feet    deccrease   Top  Foot    Fungal  Nail  Bed   And   Skin  Callus  Tip  Left   First  Toe   Sees  Podiatry      Retinopathy     Sees  Dr Alba green   Retinopathy      ckd  #3         Sees   Renal  Dr Bernabe Marcelino  35      Cr    2 04        gfr     33      ckd   #3        Proteinuria       Protein  Creatinine  Ratio     0 67 Recent  Fall      Fracture  Left   2  Ribs  Had   xrays   Fall   2/17/19  Colon  Screen   Colonoscopy   Dr Vernell Grove     Positive   High  Gr  Dysplasia   Intramucosal   adenocarcinooma     No  Invasion  To  See   Colon  Rectal  Specialist        Hypertension   Controlled     On  Ace    Hyperlipidemia   Controlled  On  Statin  Needs  To  Be  Monitored   Since      ldl   11   Would  Like  It        <   100              The following portions of the patient's history were reviewed and updated as appropriate: allergies, current medications, past family history, past medical history, past social history, past surgical history and problem list     Review of Systems   Constitutional: Negative for activity change, appetite change, chills, diaphoresis, fatigue and fever  HENT: Negative for congestion, hearing loss, sinus pressure, sinus pain and voice change  Eyes: Negative for visual disturbance  Respiratory: Negative for cough, chest tightness, shortness of breath and wheezing  Cardiovascular: Negative for chest pain, palpitations and leg swelling  Gastrointestinal: Negative for abdominal distention, abdominal pain, anal bleeding, blood in stool, constipation, diarrhea, nausea, rectal pain and vomiting  Genitourinary: Negative for difficulty urinating  Positive  ed   Musculoskeletal: Negative for arthralgias, back pain, gait problem, joint swelling, myalgias, neck pain and neck stiffness  Skin: Negative  Neurological: Positive for numbness  Negative for dizziness, tremors, seizures, syncope, facial asymmetry, speech difficulty, weakness, light-headedness and headaches  Feet  Numbness     Psychiatric/Behavioral: Negative for agitation and behavioral problems  Objective:      /80   Pulse 80   Temp 97 6 °F (36 4 °C) (Tympanic)   Resp 16   Wt 105 kg (232 lb)   SpO2 97%   BMI 32 36 kg/m²          Physical Exam   Constitutional: He is oriented to person, place, and time  No distress  HENT:   Mouth/Throat: No oropharyngeal exudate  Eyes: Pupils are equal, round, and reactive to light  Conjunctivae and EOM are normal  No scleral icterus  Neck: No JVD present  Cardiovascular: Normal rate, regular rhythm, normal heart sounds and intact distal pulses  No murmur heard  Pulmonary/Chest: Effort normal and breath sounds normal  No respiratory distress  He has no wheezes  He has no rales  Abdominal: Soft  Bowel sounds are normal  He exhibits no distension and no mass  There is no guarding  Musculoskeletal: He exhibits no edema, tenderness or deformity  Neurological: He is alert and oriented to person, place, and time  A sensory deficit is present  Coordination normal    Decrease  To  Absent  Light  Touch  Plantar  Feet    Absent  Vibration  Toes       Position  Sense  wnl    Can  Feel  Top  Foot   Light  Touch  But  Decrease  Compared  To  Below  Knee     Foot  Pulses   wnl   Skin: Skin is warm and dry  He is not diaphoretic     Tip   Left  First toe   Sl  Red   Callus  Sees  podiatry

## 2019-03-04 NOTE — PATIENT INSTRUCTIONS
Continue  To  See  Renal   Eye   Podiatry    See  The  Colon  Rectal  Surgeons    Do  Lab  Prior  To  Next  Visit    Check  Stool  For  Blood       Increase  lantus    4  Units    And in  10  Days  If  Not     <   150  In  Am  Without  Hypoglycemia  The  Increase  The  laantus   3  More  Units

## 2019-03-15 ENCOUNTER — OFFICE VISIT (OUTPATIENT)
Dept: OBGYN CLINIC | Facility: OTHER | Age: 69
End: 2019-03-15
Payer: COMMERCIAL

## 2019-03-15 VITALS
BODY MASS INDEX: 31.92 KG/M2 | HEIGHT: 71 IN | SYSTOLIC BLOOD PRESSURE: 169 MMHG | DIASTOLIC BLOOD PRESSURE: 97 MMHG | WEIGHT: 228 LBS | HEART RATE: 81 BPM

## 2019-03-15 DIAGNOSIS — M17.5 OTHER SECONDARY OSTEOARTHRITIS OF LEFT KNEE: Primary | ICD-10-CM

## 2019-03-15 PROCEDURE — 99213 OFFICE O/P EST LOW 20 MIN: CPT | Performed by: FAMILY MEDICINE

## 2019-03-15 PROCEDURE — 20611 DRAIN/INJ JOINT/BURSA W/US: CPT | Performed by: FAMILY MEDICINE

## 2019-03-15 RX ORDER — LISINOPRIL 20 MG/1
20 TABLET ORAL DAILY
Refills: 1 | COMMUNITY
Start: 2019-03-06

## 2019-03-15 RX ADMIN — LIDOCAINE HYDROCHLORIDE 3 ML: 20 INJECTION, SOLUTION EPIDURAL; INFILTRATION; INTRACAUDAL; PERINEURAL at 13:16

## 2019-03-15 NOTE — PROGRESS NOTES
1  Other secondary osteoarthritis of left knee  Large joint arthrocentesis: L knee     Orders Placed This Encounter   Procedures    Large joint arthrocentesis: L knee        Imaging Studies (I personally reviewed results in PACS):  X-ray left knee 01/05/2018:  Reviewed again severe tricompartmental osteoarthritis      Past diagnostics:  X-ray lumbar vertebra 06/22/2018:  Mild-moderate degenerative disc disease diffuse  X-ray left knee 01/05/2018:  Severe tricompartmental arthritis  MRI left knee 01/12/2018:  Severe tricompartmental arthritis with bilateral meniscal tears    IMPRESSION:  Severe left knee osteoarthritis status post septic joint arthritis 2010  Declined referral to surgeon to consider replacement    Interventions:   Viscosupplementation Synvisc-One injection left knee-09/07/2018   Corticosteroid injection-left knee-06/22/2018  Corticosteroid injection left knee-12/14/2018  Viscosupplementation Synvisc-One injection left knee ultrasound-guided-03/15/2019    Return if symptoms worsen or fail to improve  Patient Instructions   reviewed red flags and risks with patient regarding injection including but not limited to infection <0 072% , skin dimpling <1%, hypo-pigmentation <1%, nerve or artery penetration and damage, bone damage and destruction such as avascular necrosis which can require surgical intervention or joint replacement if occurs  I reviewed contraindications including but not limited to active infection, prosthetic joint, fracture, allergy to steroid or anesthetics such as lidocaine, and uncontrolled blood thinner medications such as coumadin/warfarin  patient expressed understanding and agreed to proceed with procedure  educated if any symptoms including fevers, chills, swelling, or worsening symptoms occur then to call office or go to hospital for immediate care if physician unavailable  patient expressed understanding and agreed to plan        Treatment for knee osteoarthritis depends on severity of cartilage wear and pain levels  First line for mild arthritis is exercise to strengthen the knee and allow better joint movement, 5-10% weight loss if overweight, and topical anti-inflammatories such as diclofenac gel or topical analgesic pain relief creams such as capsaicin  Symptoms at this stage usually improve in 3 months  Moderate to severe arthritis or arthritis not responding to first line treatments may require oral medicine such as anti-inflammatories (NSAIDs) such as ibuprofen/motrin, and if failing treatment with oral NSAIDs, then may consider depression medicines such as duloxetine (cymbalta) which also have been shown to work on pain receptors and help to control pain  Other analgesics such as tylenol (acetaminophen) may be used but do not appear to offer significant pain relief  Supplements such as glucosamine and chondroitin supplements  Some studies do show benefit from taking glucosamine sulfate (1500 mg/day) and chondroitin (800 mg/day) but evidence is controversial  I recommend against a type of glucosamine known as "glucosamine hydrochloride" which appears not as effective as glucosamine sulfate  If no improvement with oral medicines, then patients may consider steroid injection into the knee joint which provides pain relief  Steroid injections can be given every 3 months  Any sooner than that can  result in possible deterioration or cartilage in your joint  Other injections are available such as as viscosupplementation or gel shots into the knee which provide nutrients for the cartilage and can result in pain reduction  These viscosupplementation injections are generally considered every 6 months but are controversial   The [address] recommends against viscosupplementation injection; however, the [employer] [facility] recommends for these injections       Beyond these injections there are other controversial treatments including stem cell as well as platelet rich plasma injection which are out of pocket usually up to a 1000 dollars per injection  Lastly, if you fail conservative measures and have persistent pain, then we may consider referral to surgeon for knee replacement  (UTD Katelynn Schmitt 2018)  CHIEF COMPLAINT:  Follow-up left knee osteoarthritis    HPI:  Miah Sandoval is a 79 y o  adult  who presents for        Visit 06/22/2018:  Patient here for follow-up left knee secondary osteoarthritis status post septic joint  Today states he feels significantly improved  He states that it is last visit in March 2018 he fell he had great relief after Synvisc as well as corticosteroid injection  He does complain of mild to moderate discomfort in his left knee but this is no where near as worse at was in the past   He does request repeat corticosteroid injection today  Patient also complains of back pain today  He states that approximately 1 month ago he strained his back  He is significantly improved  He still early has some residual stiffness and left lower aspect of his back  He denies any pain radiating down his leg  Denies any numbness and tingling  He denies any weakness  His discomfort is intermittent exacerbated by certain positions only mild intensity  09/07/2018: Follow-up left knee osteoarthritis  Uncontrolled  Interval worsening since last visit  No swelling erythema or increased warmth  Source intermittent medial joint line  Last visit 06/22/2018 given corticosteroid injection with significant improvement until approximately 2-3 weeks ago  12/14/2018: Follow-up left knee significant osteoarthritis status post septic joint 2010:  Uncontrolled  Patient states has had significant relief with corticosteroid injections in the past but only mild to moderate relief with viscosupplementation performed palpation guided  Denies any injury  03/15/2019:   Follow-up evaluation of left knee osteoarthritis secondary to septic joint 2010  Patient states currently uncontrolled about 50% of his tolerate baseline pain  Denies any injury  diabetic A1c 8 4  Performs HEP with squats which improve symptoms  Review of Systems   Constitutional: Negative for chills, fever and unexpected weight change  HENT: Negative for hearing loss, nosebleeds and sore throat  Eyes: Negative for pain, redness and visual disturbance  Respiratory: Negative for cough, shortness of breath and wheezing  Cardiovascular: Negative for chest pain, palpitations and leg swelling  Gastrointestinal: Negative for abdominal distention, nausea and vomiting  Endocrine: Negative for polydipsia and polyuria  Genitourinary: Negative for dysuria and hematuria  Skin: Negative for rash and wound  Neurological: Negative for dizziness, numbness and headaches  Psychiatric/Behavioral: Negative for decreased concentration and suicidal ideas           Following history reviewed and update:    Past Medical History:   Diagnosis Date    Diabetes ([facility] [address] )     type 2    Hypertension      Past Surgical History:   Procedure Laterality Date    HERNIA REPAIR  2013    KNEE SURGERY      NECK SURGERY      basil cell carcinoma      Social History   Social History     Substance and Sexual Activity   Alcohol Use Yes    Comment: occasional     Social History     Substance and Sexual Activity   Drug Use No     Social History     Tobacco Use   Smoking Status Never Smoker   Smokeless Tobacco Never Used     Family History   Problem Relation Age of Onset    Aortic stenosis Mother     No Known Problems Father     Diabetes Paternal Grandfather      No Known Allergies       Physical Exam     Constitutional:  see vital signs  Gen: well-developed, normocephalic/atraumatic, well-groomed  Eyes: No inflammation or discharge of conjunctiva or lids; sclera clear   Pharynx: no inflammation, lesion, or mass of lips  Neck: supple, no masses, non-distended  MSK: no inflammation, lesion, mass, or clubbing of nails and digits except for other than mentioned below  SKIN: no visible rashes or skin lesions  Pulmonary/Chest: Effort normal  No respiratory distress  NEURO: cranial nerves grossly intact  PSYCH:  Alert and oriented to person, place, and time; recent and remote memory intact; mood normal, no depression, anxiety, or agitation, judgment and insight good and intact      Ortho Exam     LEFT KNEE:  Erythema: no  Swelling: no  Increased Warmth: no  Tenderness: none  Large joint arthrocentesis: L knee  Date/Time: 3/15/2019 1:16 PM  Consent given by: patient  Site marked: site marked  Timeout: Immediately prior to procedure a time out was called to verify the correct patient, procedure, equipment, support staff and site/side marked as required   Supporting Documentation  Indications: pain   Procedure Details  Location: knee - L knee  Preparation: Patient was prepped and draped in the usual sterile fashion (betadine if not allergic  alcohol before and after ethyl chloride cold spray prior to 25g needle track for anesthetic )  Needle size: 18 G  Ultrasound guidance: yes  Approach: superior  Medications administered: 48 mg hylan 48 MG/6ML; 3 mL lidocaine (PF) 2 % (3ml 2% lidocaine anesthetic needle track with 25g )    Patient tolerance: patient tolerated the procedure well with no immediate complications  Dressing:  Sterile dressing applied        LEFT KNEE USG INTRA-ARTICULAR SUPEROLATERAL INJECTION:  Injection site cleaned with Betadine as well as alcohol prior to anesthetic injection using 3 cc of 2% lidocaine prior to beginning ultrasound-guided  injection procedure  Injection site again cleaned with chlorhexidine prior to starting USG procedure  Sterile field, gloves, probe cover used for ultrasound-guided  injection with visualization of needle in-line with short axis of linear-array transducer and filling of suprapatellar pouch with injectate  Patient tolerated procedure well with minimal bleeding and no complications

## 2019-03-15 NOTE — PATIENT INSTRUCTIONS
reviewed red flags and risks with patient regarding injection including but not limited to infection <0 072% , skin dimpling <1%, hypo-pigmentation <1%, nerve or artery penetration and damage, bone damage and destruction such as avascular necrosis which can require surgical intervention or joint replacement if occurs  I reviewed contraindications including but not limited to active infection, prosthetic joint, fracture, allergy to steroid or anesthetics such as lidocaine, and uncontrolled blood thinner medications such as coumadin/warfarin  patient expressed understanding and agreed to proceed with procedure  educated if any symptoms including fevers, chills, swelling, or worsening symptoms occur then to call office or go to hospital for immediate care if physician unavailable  patient expressed understanding and agreed to plan  Treatment for knee osteoarthritis depends on severity of cartilage wear and pain levels  First line for mild arthritis is exercise to strengthen the knee and allow better joint movement, 5-10% weight loss if overweight, and topical anti-inflammatories such as diclofenac gel or topical analgesic pain relief creams such as capsaicin  Symptoms at this stage usually improve in 3 months  Moderate to severe arthritis or arthritis not responding to first line treatments may require oral medicine such as anti-inflammatories (NSAIDs) such as ibuprofen/motrin, and if failing treatment with oral NSAIDs, then may consider depression medicines such as duloxetine (cymbalta) which also have been shown to work on pain receptors and help to control pain  Other analgesics such as tylenol (acetaminophen) may be used but do not appear to offer significant pain relief  Supplements such as glucosamine and chondroitin supplements   Some studies do show benefit from taking glucosamine sulfate (1500 mg/day) and chondroitin (800 mg/day) but evidence is controversial  I recommend against a type of glucosamine known as "glucosamine hydrochloride" which appears not as effective as glucosamine sulfate  If no improvement with oral medicines, then patients may consider steroid injection into the knee joint which provides pain relief  Steroid injections can be given every 3 months  Any sooner than that can  result in possible deterioration or cartilage in your joint  Other injections are available such as as viscosupplementation or gel shots into the knee which provide nutrients for the cartilage and can result in pain reduction  These viscosupplementation injections are generally considered every 6 months but are controversial   The [address] recommends against viscosupplementation injection; however, the [employer] [facility] recommends for these injections  Beyond these injections there are other controversial treatments including stem cell as well as platelet rich plasma injection which are out of pocket usually up to a 1000 dollars per injection  Lastly, if you fail conservative measures and have persistent pain, then we may consider referral to surgeon for knee replacement  (UTCELESTINA Hernandez 2018)

## 2019-03-20 ENCOUNTER — TELEPHONE (OUTPATIENT)
Dept: OBGYN CLINIC | Facility: OTHER | Age: 69
End: 2019-03-20

## 2019-03-22 RX ORDER — LIDOCAINE HYDROCHLORIDE 20 MG/ML
3 INJECTION, SOLUTION EPIDURAL; INFILTRATION; INTRACAUDAL; PERINEURAL
Status: COMPLETED | OUTPATIENT
Start: 2019-03-15 | End: 2019-03-15

## 2019-03-25 DIAGNOSIS — Z79.4 TYPE 2 DIABETES MELLITUS WITHOUT COMPLICATION, WITH LONG-TERM CURRENT USE OF INSULIN (HCC): ICD-10-CM

## 2019-03-25 DIAGNOSIS — E11.9 TYPE 2 DIABETES MELLITUS WITHOUT COMPLICATION, WITH LONG-TERM CURRENT USE OF INSULIN (HCC): ICD-10-CM

## 2019-03-25 NOTE — TELEPHONE ENCOUNTER
Patient called and requested a medication refill for insulin aspart (NOVOLOG FLEXPEN) 100 Units/mL injection pen - inject 5 units under the skin 3 times a day with meals -5 pens  However the patient stated that he is using 10 units now    Please be advised and the patient is requesting that this medication refill be sent to [facility]

## 2019-03-28 NOTE — TELEPHONE ENCOUNTER
Pt called [facility] at last visit you increased the dose for his insulin to 10 units 3 times a day  Pt is going to run ou due to the last script only being for 5 units  Pt did not  the script sent on Monday

## 2019-04-09 LAB
LEFT EYE DIABETIC RETINOPATHY: NORMAL
RIGHT EYE DIABETIC RETINOPATHY: NORMAL

## 2019-04-24 ENCOUNTER — OFFICE VISIT (OUTPATIENT)
Dept: FAMILY MEDICINE CLINIC | Facility: CLINIC | Age: 69
End: 2019-04-24
Payer: COMMERCIAL

## 2019-04-24 VITALS
SYSTOLIC BLOOD PRESSURE: 170 MMHG | RESPIRATION RATE: 17 BRPM | OXYGEN SATURATION: 98 % | HEART RATE: 74 BPM | HEIGHT: 71 IN | DIASTOLIC BLOOD PRESSURE: 90 MMHG | BODY MASS INDEX: 33.04 KG/M2 | TEMPERATURE: 98.9 F | WEIGHT: 236 LBS

## 2019-04-24 DIAGNOSIS — Z01.818 PREOP EXAMINATION: ICD-10-CM

## 2019-04-24 DIAGNOSIS — E87.5 HYPERKALEMIA: ICD-10-CM

## 2019-04-24 DIAGNOSIS — N18.4 STAGE 4 CHRONIC KIDNEY DISEASE (HCC): Primary | ICD-10-CM

## 2019-04-24 PROCEDURE — 99214 OFFICE O/P EST MOD 30 MIN: CPT | Performed by: SPECIALIST

## 2019-04-24 PROCEDURE — 3066F NEPHROPATHY DOC TX: CPT | Performed by: SPECIALIST

## 2019-05-01 ENCOUNTER — TELEPHONE (OUTPATIENT)
Dept: FAMILY MEDICINE CLINIC | Facility: CLINIC | Age: 69
End: 2019-05-01

## 2019-05-06 ENCOUNTER — TELEPHONE (OUTPATIENT)
Dept: FAMILY MEDICINE CLINIC | Facility: CLINIC | Age: 69
End: 2019-05-06

## 2019-06-13 ENCOUNTER — TELEPHONE (OUTPATIENT)
Dept: FAMILY MEDICINE CLINIC | Facility: CLINIC | Age: 69
End: 2019-06-13

## 2019-06-19 LAB
CREAT ?TM UR-SCNC: 132 UMOL/L
EXT PROTEIN URINE: 27.9
PROT/CREAT UR: 0.21 MG/G{CREAT}

## 2019-06-20 DIAGNOSIS — Z79.4 TYPE 2 DIABETES MELLITUS WITHOUT COMPLICATION, WITH LONG-TERM CURRENT USE OF INSULIN (HCC): ICD-10-CM

## 2019-06-20 DIAGNOSIS — E11.9 TYPE 2 DIABETES MELLITUS WITHOUT COMPLICATION, WITH LONG-TERM CURRENT USE OF INSULIN (HCC): ICD-10-CM

## 2019-06-20 RX ORDER — INSULIN ASPART 100 [IU]/ML
INJECTION, SOLUTION INTRAVENOUS; SUBCUTANEOUS
Qty: 5 PEN | Refills: 3 | Status: SHIPPED | OUTPATIENT
Start: 2019-06-20 | End: 2020-02-24 | Stop reason: SDUPTHER

## 2019-06-21 ENCOUNTER — OFFICE VISIT (OUTPATIENT)
Dept: OBGYN CLINIC | Facility: OTHER | Age: 69
End: 2019-06-21
Payer: COMMERCIAL

## 2019-06-21 VITALS
HEIGHT: 71 IN | SYSTOLIC BLOOD PRESSURE: 142 MMHG | BODY MASS INDEX: 33.4 KG/M2 | WEIGHT: 238.6 LBS | DIASTOLIC BLOOD PRESSURE: 82 MMHG | HEART RATE: 77 BPM

## 2019-06-21 DIAGNOSIS — M17.12 PRIMARY OSTEOARTHRITIS OF LEFT KNEE: Primary | ICD-10-CM

## 2019-06-21 PROCEDURE — 20611 DRAIN/INJ JOINT/BURSA W/US: CPT | Performed by: FAMILY MEDICINE

## 2019-06-21 PROCEDURE — 99214 OFFICE O/P EST MOD 30 MIN: CPT | Performed by: FAMILY MEDICINE

## 2019-06-21 RX ORDER — LIDOCAINE HYDROCHLORIDE 20 MG/ML
5 INJECTION, SOLUTION EPIDURAL; INFILTRATION; INTRACAUDAL; PERINEURAL
Status: COMPLETED | OUTPATIENT
Start: 2019-06-21 | End: 2019-06-21

## 2019-06-21 RX ORDER — LIDOCAINE HYDROCHLORIDE 20 MG/ML
4 INJECTION, SOLUTION EPIDURAL; INFILTRATION; INTRACAUDAL; PERINEURAL
Status: COMPLETED | OUTPATIENT
Start: 2019-06-21 | End: 2019-06-21

## 2019-06-21 RX ORDER — TRIAMCINOLONE ACETONIDE 40 MG/ML
40 INJECTION, SUSPENSION INTRA-ARTICULAR; INTRAMUSCULAR
Status: COMPLETED | OUTPATIENT
Start: 2019-06-21 | End: 2019-06-21

## 2019-06-21 RX ADMIN — LIDOCAINE HYDROCHLORIDE 4 ML: 20 INJECTION, SOLUTION EPIDURAL; INFILTRATION; INTRACAUDAL; PERINEURAL at 10:55

## 2019-06-21 RX ADMIN — LIDOCAINE HYDROCHLORIDE 5 ML: 20 INJECTION, SOLUTION EPIDURAL; INFILTRATION; INTRACAUDAL; PERINEURAL at 10:55

## 2019-06-21 RX ADMIN — TRIAMCINOLONE ACETONIDE 40 MG: 40 INJECTION, SUSPENSION INTRA-ARTICULAR; INTRAMUSCULAR at 10:55

## 2019-06-24 ENCOUNTER — TELEPHONE (OUTPATIENT)
Dept: FAMILY MEDICINE CLINIC | Facility: CLINIC | Age: 69
End: 2019-06-24

## 2019-07-01 ENCOUNTER — OFFICE VISIT (OUTPATIENT)
Dept: FAMILY MEDICINE CLINIC | Facility: CLINIC | Age: 69
End: 2019-07-01
Payer: COMMERCIAL

## 2019-07-01 VITALS
BODY MASS INDEX: 33.04 KG/M2 | OXYGEN SATURATION: 98 % | HEART RATE: 71 BPM | HEIGHT: 71 IN | TEMPERATURE: 97.9 F | SYSTOLIC BLOOD PRESSURE: 160 MMHG | DIASTOLIC BLOOD PRESSURE: 90 MMHG | RESPIRATION RATE: 17 BRPM | WEIGHT: 236 LBS

## 2019-07-01 DIAGNOSIS — I10 ESSENTIAL HYPERTENSION: ICD-10-CM

## 2019-07-01 DIAGNOSIS — E11.9 TYPE 2 DIABETES MELLITUS WITHOUT COMPLICATION, WITH LONG-TERM CURRENT USE OF INSULIN (HCC): ICD-10-CM

## 2019-07-01 DIAGNOSIS — N18.4 STAGE 4 CHRONIC KIDNEY DISEASE (HCC): ICD-10-CM

## 2019-07-01 DIAGNOSIS — Z01.818 PREOP EXAM FOR INTERNAL MEDICINE: Primary | ICD-10-CM

## 2019-07-01 DIAGNOSIS — R80.9 PROTEINURIA, UNSPECIFIED TYPE: ICD-10-CM

## 2019-07-01 DIAGNOSIS — Z79.4 TYPE 2 DIABETES MELLITUS WITHOUT COMPLICATION, WITH LONG-TERM CURRENT USE OF INSULIN (HCC): ICD-10-CM

## 2019-07-01 LAB — SL AMB POCT HEMOGLOBIN AIC: 6.1 (ref ?–6.5)

## 2019-07-01 PROCEDURE — 3044F HG A1C LEVEL LT 7.0%: CPT | Performed by: SPECIALIST

## 2019-07-01 PROCEDURE — 83036 HEMOGLOBIN GLYCOSYLATED A1C: CPT | Performed by: SPECIALIST

## 2019-07-01 PROCEDURE — 1160F RVW MEDS BY RX/DR IN RCRD: CPT | Performed by: SPECIALIST

## 2019-07-01 PROCEDURE — 99214 OFFICE O/P EST MOD 30 MIN: CPT | Performed by: SPECIALIST

## 2019-07-01 PROCEDURE — 3008F BODY MASS INDEX DOCD: CPT | Performed by: SPECIALIST

## 2019-07-01 NOTE — PROGRESS NOTES
79-year-old male Assessment/Plan:      Medically  Ok  For  Proposed  Surgery   No  Active  Cardiac  Or  Pulmonary  Symptoms      bp  On  High  Side  Today    096  Systolic   At  Home   398       While  At  The  [address] [place] [address] Rd  Resection please  Consult  Renal  And   Cardiology    See  Dr Rolando Cotto  Renal    And   Dr Jenni Mae  Cardiology           Patient seen in office today  During the visit I was accompanied by MA while physical exam was completed  No problem-specific Assessment & Plan notes found for this encounter  Problem List Items Addressed This Visit     None      Visit Diagnoses     Preop exam for internal medicine    -  Primary    Type 2 diabetes mellitus without complication, with long-term current use of insulin ([facility])        Stage 4 chronic kidney disease ([facility] [address] )        Essential hypertension                Subjective:     BMI Counseling: There is no height or weight on file to calculate BMI  Discussed the patient's BMI with him  The BMI is above average  BMI counseling and education was provided to the patient  Nutrition recommendations include decreasing overall calorie intake  Patient ID: Denia Lynn is a 79 y o  male  79-year-old male scheduled for colon resection having had a prior colonoscopy showing carcinoma with:   Within a polyp is polyp with no be removed to the colonoscope     Is known diabetic he reports that his sugars are well controlled at home his A1c will be done today    He  does not have any angina the more palpitations of the heart    Id known chronic kidney disease with proteinuria BUN was 51 creatinine was 2 36 GFR 28 chronic kidney disease stage 4 it may his BUN was 38 creatinine 2 56 and GFR was 25     He is on lisinopril 20 mg a day and is followed both by renal specialist and Cardiology    He feels very well he lifts weights on a routine basis he can walk more than 4 blocks and 2 flights of steps without a problem    Medically cleared for proposed colon resection I would recommend    I would recommend consultations with Cardiology and Renal  while hospitalized          The following portions of the patient's history were reviewed and updated as appropriate: allergies, current medications, past family history, past medical history, past social history, past surgical history and problem list     Review of Systems   Constitutional: Negative for activity change, appetite change, chills, diaphoresis, fatigue and fever  HENT: Negative for congestion, dental problem, sinus pressure, sinus pain and voice change  Eyes: Positive for visual disturbance  Cataract left eye   Respiratory: Positive for cough  Negative for apnea, chest tightness, shortness of breath and wheezing  Patient has a dry cough since he has been on lisinopril and is aware that this is a known side effect   Cardiovascular: Negative for chest pain, palpitations and leg swelling  Gastrointestinal: Negative for abdominal distention and abdominal pain  Genitourinary: Negative for difficulty urinating  Musculoskeletal: Negative for arthralgias, back pain, gait problem, joint swelling, myalgias, neck pain and neck stiffness  Skin: Negative  Neurological: Negative for dizziness, tremors, seizures, syncope, facial asymmetry, speech difficulty, weakness, light-headedness, numbness and headaches  Psychiatric/Behavioral: Negative for agitation  for    Objective: There were no vitals taken for this visit  Physical Exam   Constitutional: He is oriented to person, place, and time  No distress  HENT:   Mouth/Throat: No oropharyngeal exudate  Eyes: Right eye exhibits no discharge  Left eye exhibits no discharge  No scleral icterus  Neck: No JVD present  No thyromegaly present  Cardiovascular: Normal rate, regular rhythm, normal heart sounds and intact distal pulses  No murmur heard    Pulmonary/Chest: Effort normal and breath sounds normal  No respiratory distress  He has no rales  Abdominal: Soft  Bowel sounds are normal  He exhibits no distension and no mass  Musculoskeletal: Normal range of motion  He exhibits no edema or deformity  Neurological: He is alert and oriented to person, place, and time  Known peripheral neuropathy with decreased light touch sensation   Skin: Skin is warm and dry  No rash noted  He is not diaphoretic  No erythema  Psychiatric: He has a normal mood and affect

## 2019-07-25 ENCOUNTER — TELEPHONE (OUTPATIENT)
Dept: FAMILY MEDICINE CLINIC | Facility: CLINIC | Age: 69
End: 2019-07-25

## 2019-07-25 NOTE — TELEPHONE ENCOUNTER
Patient is requesting a oncologist recommendation  Patient just had a large Cancerous tumor removed from colon from [facility]  This is in chart   Please advise

## 2019-12-20 DIAGNOSIS — Z79.4 TYPE 2 DIABETES MELLITUS WITHOUT COMPLICATION, WITH LONG-TERM CURRENT USE OF INSULIN (HCC): ICD-10-CM

## 2019-12-20 DIAGNOSIS — E11.9 TYPE 2 DIABETES MELLITUS WITHOUT COMPLICATION, WITH LONG-TERM CURRENT USE OF INSULIN (HCC): ICD-10-CM

## 2019-12-23 RX ORDER — INSULIN GLARGINE 100 [IU]/ML
INJECTION, SOLUTION SUBCUTANEOUS
Qty: 5 PEN | Refills: 3 | Status: SHIPPED | OUTPATIENT
Start: 2019-12-23 | End: 2020-02-24 | Stop reason: SDUPTHER

## 2020-02-24 DIAGNOSIS — E11.9 TYPE 2 DIABETES MELLITUS WITHOUT COMPLICATION, WITH LONG-TERM CURRENT USE OF INSULIN (HCC): ICD-10-CM

## 2020-02-24 DIAGNOSIS — Z79.4 TYPE 2 DIABETES MELLITUS WITHOUT COMPLICATION, WITH LONG-TERM CURRENT USE OF INSULIN (HCC): ICD-10-CM

## 2020-02-24 RX ORDER — INSULIN ASPART 100 [IU]/ML
15 INJECTION, SOLUTION INTRAVENOUS; SUBCUTANEOUS
Qty: 5 PEN | Refills: 3 | Status: SHIPPED | OUTPATIENT
Start: 2020-02-24 | End: 2020-02-26 | Stop reason: SDUPTHER

## 2020-02-26 DIAGNOSIS — Z79.4 TYPE 2 DIABETES MELLITUS WITHOUT COMPLICATION, WITH LONG-TERM CURRENT USE OF INSULIN (HCC): ICD-10-CM

## 2020-02-26 DIAGNOSIS — E11.9 TYPE 2 DIABETES MELLITUS WITHOUT COMPLICATION, WITH LONG-TERM CURRENT USE OF INSULIN (HCC): ICD-10-CM

## 2020-02-27 RX ORDER — INSULIN ASPART 100 [IU]/ML
15 INJECTION, SOLUTION INTRAVENOUS; SUBCUTANEOUS
Qty: 15 PEN | Refills: 3 | Status: SHIPPED | OUTPATIENT
Start: 2020-02-27 | End: 2020-04-17 | Stop reason: SDUPTHER

## 2020-04-17 DIAGNOSIS — E11.9 TYPE 2 DIABETES MELLITUS WITHOUT COMPLICATION, WITH LONG-TERM CURRENT USE OF INSULIN (HCC): ICD-10-CM

## 2020-04-17 DIAGNOSIS — Z79.4 TYPE 2 DIABETES MELLITUS WITHOUT COMPLICATION, WITH LONG-TERM CURRENT USE OF INSULIN (HCC): ICD-10-CM

## 2020-04-17 RX ORDER — INSULIN ASPART 100 [IU]/ML
15 INJECTION, SOLUTION INTRAVENOUS; SUBCUTANEOUS
Qty: 15 PEN | Refills: 1 | Status: SHIPPED | OUTPATIENT
Start: 2020-04-17 | End: 2021-01-08 | Stop reason: SDUPTHER

## 2020-05-19 ENCOUNTER — OFFICE VISIT (OUTPATIENT)
Dept: FAMILY MEDICINE CLINIC | Facility: CLINIC | Age: 70
End: 2020-05-19
Payer: COMMERCIAL

## 2020-05-19 VITALS
TEMPERATURE: 97.8 F | HEIGHT: 71 IN | SYSTOLIC BLOOD PRESSURE: 140 MMHG | RESPIRATION RATE: 18 BRPM | OXYGEN SATURATION: 98 % | HEART RATE: 75 BPM | DIASTOLIC BLOOD PRESSURE: 90 MMHG | BODY MASS INDEX: 33.35 KG/M2 | WEIGHT: 238.2 LBS

## 2020-05-19 DIAGNOSIS — Z79.4 TYPE 2 DIABETES MELLITUS WITHOUT COMPLICATION, WITH LONG-TERM CURRENT USE OF INSULIN (HCC): ICD-10-CM

## 2020-05-19 DIAGNOSIS — E11.9 TYPE 2 DIABETES MELLITUS WITHOUT COMPLICATION, WITH LONG-TERM CURRENT USE OF INSULIN (HCC): ICD-10-CM

## 2020-05-19 DIAGNOSIS — L72.3 INFECTED SEBACEOUS CYST OF SKIN: Primary | ICD-10-CM

## 2020-05-19 DIAGNOSIS — L08.9 INFECTED SEBACEOUS CYST OF SKIN: Primary | ICD-10-CM

## 2020-05-19 DIAGNOSIS — I10 ESSENTIAL HYPERTENSION: ICD-10-CM

## 2020-05-19 DIAGNOSIS — E11.22 TYPE 2 DIABETES MELLITUS WITH ESRD (END-STAGE RENAL DISEASE) (HCC): ICD-10-CM

## 2020-05-19 DIAGNOSIS — E11.22 TYPE 2 DIABETES MELLITUS WITH STAGE 3 CHRONIC KIDNEY DISEASE, UNSPECIFIED WHETHER LONG TERM INSULIN USE: ICD-10-CM

## 2020-05-19 DIAGNOSIS — N18.6 TYPE 2 DIABETES MELLITUS WITH ESRD (END-STAGE RENAL DISEASE) (HCC): ICD-10-CM

## 2020-05-19 DIAGNOSIS — Z79.4 TYPE 2 DIABETES MELLITUS WITH BOTH EYES AFFECTED BY MODERATE NONPROLIFERATIVE RETINOPATHY WITHOUT MACULAR EDEMA, WITH LONG-TERM CURRENT USE OF INSULIN (HCC): ICD-10-CM

## 2020-05-19 DIAGNOSIS — N18.30 STAGE 3 CHRONIC KIDNEY DISEASE (HCC): ICD-10-CM

## 2020-05-19 DIAGNOSIS — C18.5 MALIGNANT NEOPLASM OF SPLENIC FLEXURE (HCC): ICD-10-CM

## 2020-05-19 DIAGNOSIS — N18.3 TYPE 2 DIABETES MELLITUS WITH STAGE 3 CHRONIC KIDNEY DISEASE, UNSPECIFIED WHETHER LONG TERM INSULIN USE: ICD-10-CM

## 2020-05-19 DIAGNOSIS — E78.2 MIXED HYPERLIPIDEMIA: ICD-10-CM

## 2020-05-19 DIAGNOSIS — E11.3393 TYPE 2 DIABETES MELLITUS WITH BOTH EYES AFFECTED BY MODERATE NONPROLIFERATIVE RETINOPATHY WITHOUT MACULAR EDEMA, WITH LONG-TERM CURRENT USE OF INSULIN (HCC): ICD-10-CM

## 2020-05-19 PROBLEM — E78.49 OTHER HYPERLIPIDEMIA: Status: ACTIVE | Noted: 2019-03-20

## 2020-05-19 PROBLEM — C44.41 BASAL CELL CARCINOMA OF SCALP: Status: ACTIVE | Noted: 2018-05-07

## 2020-05-19 PROCEDURE — 1036F TOBACCO NON-USER: CPT | Performed by: INTERNAL MEDICINE

## 2020-05-19 PROCEDURE — 3066F NEPHROPATHY DOC TX: CPT | Performed by: INTERNAL MEDICINE

## 2020-05-19 PROCEDURE — 3008F BODY MASS INDEX DOCD: CPT | Performed by: INTERNAL MEDICINE

## 2020-05-19 PROCEDURE — 4040F PNEUMOC VAC/ADMIN/RCVD: CPT | Performed by: INTERNAL MEDICINE

## 2020-05-19 PROCEDURE — 99214 OFFICE O/P EST MOD 30 MIN: CPT | Performed by: INTERNAL MEDICINE

## 2020-05-19 PROCEDURE — 1160F RVW MEDS BY RX/DR IN RCRD: CPT | Performed by: INTERNAL MEDICINE

## 2020-05-19 RX ORDER — CEPHALEXIN 500 MG/1
500 CAPSULE ORAL EVERY 6 HOURS SCHEDULED
Qty: 56 CAPSULE | Refills: 0 | Status: SHIPPED | OUTPATIENT
Start: 2020-05-19 | End: 2020-05-19 | Stop reason: DRUGHIGH

## 2020-05-19 RX ORDER — CEPHALEXIN 500 MG/1
500 CAPSULE ORAL EVERY 8 HOURS SCHEDULED
Qty: 42 CAPSULE | Refills: 0 | Status: SHIPPED | OUTPATIENT
Start: 2020-05-19 | End: 2020-06-02

## 2020-09-14 DIAGNOSIS — Z23 ENCOUNTER FOR IMMUNIZATION: ICD-10-CM

## 2020-09-14 DIAGNOSIS — N18.6 TYPE 2 DIABETES MELLITUS WITH ESRD (END-STAGE RENAL DISEASE) (HCC): ICD-10-CM

## 2020-09-14 DIAGNOSIS — E11.22 TYPE 2 DIABETES MELLITUS WITH ESRD (END-STAGE RENAL DISEASE) (HCC): ICD-10-CM

## 2020-12-11 ENCOUNTER — LAB REQUISITION (OUTPATIENT)
Dept: LAB | Facility: HOSPITAL | Age: 70
End: 2020-12-11
Payer: COMMERCIAL

## 2020-12-11 DIAGNOSIS — K63.5 POLYP OF COLON: ICD-10-CM

## 2020-12-11 PROCEDURE — 88305 TISSUE EXAM BY PATHOLOGIST: CPT | Performed by: PATHOLOGY

## 2021-01-08 DIAGNOSIS — E11.9 TYPE 2 DIABETES MELLITUS WITHOUT COMPLICATION, WITH LONG-TERM CURRENT USE OF INSULIN (HCC): ICD-10-CM

## 2021-01-08 DIAGNOSIS — Z79.4 TYPE 2 DIABETES MELLITUS WITHOUT COMPLICATION, WITH LONG-TERM CURRENT USE OF INSULIN (HCC): ICD-10-CM

## 2021-01-11 RX ORDER — INSULIN GLARGINE 100 [IU]/ML
45 INJECTION, SOLUTION SUBCUTANEOUS DAILY
Qty: 15 PEN | Refills: 0 | Status: SHIPPED | OUTPATIENT
Start: 2021-01-11 | End: 2021-01-20 | Stop reason: SDUPTHER

## 2021-01-11 RX ORDER — INSULIN ASPART 100 [IU]/ML
15 INJECTION, SOLUTION INTRAVENOUS; SUBCUTANEOUS
Qty: 15 PEN | Refills: 0 | Status: SHIPPED | OUTPATIENT
Start: 2021-01-11

## 2021-01-20 DIAGNOSIS — E11.9 TYPE 2 DIABETES MELLITUS WITHOUT COMPLICATION, WITH LONG-TERM CURRENT USE OF INSULIN (HCC): ICD-10-CM

## 2021-01-20 DIAGNOSIS — Z79.4 TYPE 2 DIABETES MELLITUS WITHOUT COMPLICATION, WITH LONG-TERM CURRENT USE OF INSULIN (HCC): ICD-10-CM

## 2021-01-20 RX ORDER — INSULIN GLARGINE 100 [IU]/ML
45 INJECTION, SOLUTION SUBCUTANEOUS DAILY
Qty: 15 PEN | Refills: 1 | Status: SHIPPED | OUTPATIENT
Start: 2021-01-20 | End: 2021-06-01

## 2021-03-10 DIAGNOSIS — Z23 ENCOUNTER FOR IMMUNIZATION: ICD-10-CM

## 2021-04-08 ENCOUNTER — VBI (OUTPATIENT)
Dept: ADMINISTRATIVE | Facility: OTHER | Age: 71
End: 2021-04-08

## 2021-06-01 DIAGNOSIS — Z79.4 TYPE 2 DIABETES MELLITUS WITHOUT COMPLICATION, WITH LONG-TERM CURRENT USE OF INSULIN (HCC): ICD-10-CM

## 2021-06-01 DIAGNOSIS — E11.9 TYPE 2 DIABETES MELLITUS WITHOUT COMPLICATION, WITH LONG-TERM CURRENT USE OF INSULIN (HCC): ICD-10-CM

## 2021-06-01 RX ORDER — INSULIN GLARGINE 100 [IU]/ML
INJECTION, SOLUTION SUBCUTANEOUS
Qty: 15 ML | Refills: 1 | Status: SHIPPED | OUTPATIENT
Start: 2021-06-01 | End: 2021-06-02 | Stop reason: SDUPTHER

## 2021-06-02 DIAGNOSIS — Z79.4 TYPE 2 DIABETES MELLITUS WITHOUT COMPLICATION, WITH LONG-TERM CURRENT USE OF INSULIN (HCC): ICD-10-CM

## 2021-06-02 DIAGNOSIS — E11.9 TYPE 2 DIABETES MELLITUS WITHOUT COMPLICATION, WITH LONG-TERM CURRENT USE OF INSULIN (HCC): ICD-10-CM

## 2021-06-02 RX ORDER — INSULIN GLARGINE 100 [IU]/ML
INJECTION, SOLUTION SUBCUTANEOUS
Qty: 15 ML | Refills: 3 | Status: SHIPPED | OUTPATIENT
Start: 2021-06-02

## 2021-08-11 ENCOUNTER — VBI (OUTPATIENT)
Dept: ADMINISTRATIVE | Facility: OTHER | Age: 71
End: 2021-08-11

## 2021-11-19 ENCOUNTER — VBI (OUTPATIENT)
Dept: ADMINISTRATIVE | Facility: OTHER | Age: 71
End: 2021-11-19

## 2022-02-10 ENCOUNTER — VBI (OUTPATIENT)
Dept: ADMINISTRATIVE | Facility: OTHER | Age: 72
End: 2022-02-10

## 2022-05-05 ENCOUNTER — VBI (OUTPATIENT)
Dept: ADMINISTRATIVE | Facility: OTHER | Age: 72
End: 2022-05-05